# Patient Record
Sex: FEMALE | Race: WHITE | NOT HISPANIC OR LATINO | Employment: OTHER | URBAN - METROPOLITAN AREA
[De-identification: names, ages, dates, MRNs, and addresses within clinical notes are randomized per-mention and may not be internally consistent; named-entity substitution may affect disease eponyms.]

---

## 2017-01-05 PROBLEM — C18.2 MALIGNANT NEOPLASM OF ASCENDING COLON (HCC): Status: ACTIVE | Noted: 2017-01-05

## 2017-01-18 RX ORDER — INFLUENZA A VIRUS A/MICHIGAN/45/2015 X-275 (H1N1) ANTIGEN (FORMALDEHYDE INACTIVATED), INFLUENZA A VIRUS A/SINGAPORE/INFIMH-16-0019/2016 IVR-186 (H3N2) ANTIGEN (FORMALDEHYDE INACTIVATED), AND INFLUENZA B VIRUS B/MARYLAND/15/2016 BX-69A (A B/COLORADO/6/2017-LIKE VIRUS) ANTIGEN (FORMALDEHYDE INACTIVATED) 60; 60; 60 UG/.5ML; UG/.5ML; UG/.5ML
INJECTION, SUSPENSION INTRAMUSCULAR
Refills: 0 | COMMUNITY
Start: 2016-10-26 | End: 2017-01-27

## 2017-01-18 RX ORDER — PNEUMOCOCCAL 13-VALENT CONJUGATE VACCINE 2.2; 2.2; 2.2; 2.2; 2.2; 4.4; 2.2; 2.2; 2.2; 2.2; 2.2; 2.2; 2.2 UG/.5ML; UG/.5ML; UG/.5ML; UG/.5ML; UG/.5ML; UG/.5ML; UG/.5ML; UG/.5ML; UG/.5ML; UG/.5ML; UG/.5ML; UG/.5ML; UG/.5ML
INJECTION, SUSPENSION INTRAMUSCULAR
Refills: 0 | COMMUNITY
Start: 2016-10-26

## 2017-01-27 ENCOUNTER — LAB (OUTPATIENT)
Dept: LAB | Facility: HOSPITAL | Age: 77
End: 2017-01-27

## 2017-01-27 ENCOUNTER — OFFICE VISIT (OUTPATIENT)
Dept: ONCOLOGY | Facility: CLINIC | Age: 77
End: 2017-01-27

## 2017-01-27 VITALS
WEIGHT: 142 LBS | SYSTOLIC BLOOD PRESSURE: 112 MMHG | DIASTOLIC BLOOD PRESSURE: 60 MMHG | HEIGHT: 62 IN | HEART RATE: 86 BPM | RESPIRATION RATE: 16 BRPM | BODY MASS INDEX: 26.13 KG/M2 | TEMPERATURE: 98.4 F

## 2017-01-27 DIAGNOSIS — D64.9 ANEMIA, UNSPECIFIED TYPE: Primary | ICD-10-CM

## 2017-01-27 LAB
BASOPHILS # BLD AUTO: 0.02 10*3/MM3 (ref 0–0.1)
BASOPHILS NFR BLD AUTO: 0.3 % (ref 0–1.1)
DEPRECATED RDW RBC AUTO: 45.2 FL (ref 37–49)
EOSINOPHIL # BLD AUTO: 0.27 10*3/MM3 (ref 0–0.36)
EOSINOPHIL NFR BLD AUTO: 4.1 % (ref 1–5)
ERYTHROCYTE [DISTWIDTH] IN BLOOD BY AUTOMATED COUNT: 14.4 % (ref 11.7–14.5)
HCT VFR BLD AUTO: 41.2 % (ref 34–45)
HGB BLD-MCNC: 12.9 G/DL (ref 11.5–14.9)
IMM GRANULOCYTES # BLD: 0.02 10*3/MM3 (ref 0–0.03)
IMM GRANULOCYTES NFR BLD: 0.3 % (ref 0–0.5)
LYMPHOCYTES # BLD AUTO: 2.31 10*3/MM3 (ref 1–3.5)
LYMPHOCYTES NFR BLD AUTO: 35.4 % (ref 20–49)
MCH RBC QN AUTO: 26.9 PG (ref 27–33)
MCHC RBC AUTO-ENTMCNC: 31.3 G/DL (ref 32–35)
MCV RBC AUTO: 85.8 FL (ref 83–97)
MONOCYTES # BLD AUTO: 0.53 10*3/MM3 (ref 0.25–0.8)
MONOCYTES NFR BLD AUTO: 8.1 % (ref 4–12)
NEUTROPHILS # BLD AUTO: 3.37 10*3/MM3 (ref 1.5–7)
NEUTROPHILS NFR BLD AUTO: 51.8 % (ref 39–75)
NRBC BLD MANUAL-RTO: 0 /100 WBC (ref 0–0)
PLATELET # BLD AUTO: 235 10*3/MM3 (ref 150–375)
PMV BLD AUTO: 10.7 FL (ref 8.9–12.1)
RBC # BLD AUTO: 4.8 10*6/MM3 (ref 3.9–5)
WBC NRBC COR # BLD: 6.52 10*3/MM3 (ref 4–10)

## 2017-01-27 PROCEDURE — 36416 COLLJ CAPILLARY BLOOD SPEC: CPT

## 2017-01-27 PROCEDURE — 85025 COMPLETE CBC W/AUTO DIFF WBC: CPT

## 2017-01-27 PROCEDURE — 99214 OFFICE O/P EST MOD 30 MIN: CPT | Performed by: INTERNAL MEDICINE

## 2017-01-31 NOTE — PROGRESS NOTES
REASONS FOR FOLLOWUP:     T3N1(13 nodes neg), stage 3 colon ca      HISTORY OF PRESENT ILLNESS:  The patient is a 76 y.o. year old female  who is here for follow-up with the above-mentioned history.We had previously discussed Xeloda therapy adjuvantly which we will now pursue.   Chemoeducation and capecitabine procurement are pursued.    Past Medical History   Diagnosis Date   • Diabetes mellitus    • Hyperlipidemia    • Hypertension    • Neuropathy      Past Surgical History   Procedure Laterality Date   • Hysterectomy     • Colon resection Right 12/31/2016     Procedure: COLON RESECTION RIGHT;  Surgeon: Dmitri Aranda MD;  Location: [facility];  Service:    • Cholecystectomy N/A 12/31/2016     Procedure: CHOLECYSTECTOMY;  Surgeon: Dmitri Aranda MD;  Location: [facility];  Service:        HEMATOLOGIC/ONCOLOGIC HISTORY:  (History from previous dates can be found in the separate document.)    MEDICATIONS    Current Outpatient Prescriptions:   •  aspirin 325 MG tablet, Take 325 mg by mouth., Disp: , Rfl:   •  carvedilol (COREG) 6.25 MG tablet, Take 1 tablet by mouth 2 (two) times a day with meals., Disp: 60 tablet, Rfl: 0  •  colchicine 0.6 MG tablet, Take 1 tablet by mouth every 12 (twelve) hours. Until pain completely gone in ankle, Disp: 60 tablet, Rfl: 0  •  dicyclomine (BENTYL) 20 MG tablet, Take 1 tablet by mouth Every 6 (Six) Hours., Disp: 30 tablet, Rfl: 0  •  ergocalciferol (ERGOCALCIFEROL) 89850 UNITS capsule, Take 50,000 Units by mouth., Disp: , Rfl:   •  ferrous sulfate 325 (65 FE) MG tablet, Take 1 tablet by mouth 2 (Two) Times a Day With Meals for 30 days., Disp: 60 tablet, Rfl: 0  •  gabapentin (NEURONTIN) 600 MG tablet, Take 800 mg by mouth 3 (Three) Times a Day., Disp: , Rfl:   •  hydrochlorothiazide (HYDRODIURIL) 25 MG tablet, Take 25 mg by mouth daily., Disp: , Rfl:   •  levoFLOXacin (LEVAQUIN) 750 MG tablet, Take  by mouth., Disp: , Rfl:   •  levothyroxine (SYNTHROID, LEVOTHROID) 50  "MCG tablet, Take 88 mcg by mouth Daily., Disp: , Rfl:   •  metFORMIN (GLUCOPHAGE) 500 MG tablet, Take 500 mg by mouth 2 (two) times a day with meals., Disp: , Rfl:   •  pravastatin (PRAVACHOL) 40 MG tablet, Take 40 mg by mouth daily., Disp: , Rfl:   •  PREVNAR 13 vaccine, inject 0.5 milliliter intramuscularly, Disp: , Rfl: 0    ALLERGIES:   No Known Allergies    SOCIAL HISTORY:       Social History     Social History   • Marital status:      Spouse name: N/A   • Number of children: N/A   • Years of education: N/A     Occupational History   • Retired      Social History Main Topics   • Smoking status: Never Smoker   • Smokeless tobacco: Not on file   • Alcohol use No   • Drug use: No   • Sexual activity: Defer     Other Topics Concern   • Not on file     Social History Narrative         FAMILY HISTORY:  Family History   Problem Relation Age of Onset   • Heart disease Mother    • Cancer Father        REVIEW OF SYSTEMS:  GENERAL: No change in appetite or weight;   No fevers, chills, sweats.    SKIN: No nonhealing lesions.   No rashes.  HEME/LYMPH: No easy bruising, bleeding.   No swollen nodes.   EYES: No vision changes or diplopia.   ENT: No tinnitus, hearing loss, gum bleeding, epistaxis, hoarseness or dysphagia.   RESPIRATORY: No cough, shortness of breath, hemoptysis or wheezing.   CVS: No chest pain, palpitations, orthopnea, dyspnea on exertion or PND.   GI: No melena or hematochezia.   No abdominal pain.  No nausea, vomiting, constipation, diarrhea  : No lower tract obstructive symptoms, dysuria or hematuria.   MUSCULOSKELETAL: No bone pain.  No joint stiffness.   NEUROLOGICAL: No global weakness, loss of consciousness or seizures.   PSYCHIATRIC: No increased nervousness, mood changes or depression.     Objective    Vitals:    01/27/17 1352   BP: 112/60   Pulse: 86   Resp: 16   Temp: 98.4 °F (36.9 °C)   Weight: 142 lb (64.4 kg)   Height: 61.81\" (157 cm)   PainSc: 5  Comment: from her surgery     Current " Status 1/27/2017   ECOG score 0      PHYSICAL EXAM:    GENERAL:  Well-developed, well-nourished in no acute distress.   SKIN:  Warm, dry without rashes, purpura or petechiae.  EYES:  Pupils equal, round and reactive to light.  EOMs intact.  Conjunctivae normal.  EARS:  Hearing intact.  NOSE:  Septum midline.  No excoriations or nasal discharge.  MOUTH:  Tongue is well-papillated; no stomatitis or ulcers.  Lips normal.  THROAT:  Oropharynx without lesions or exudates.  NECK:  Supple with good range of motion; no thyromegaly or masses, no JVD.  LYMPHATICS:  No cervical, supraclavicular, axillary or inguinal adenopathy.  CHEST:  Lungs clear to auscultation. Good airflow.  CARDIAC:  Regular rate and rhythm without murmurs, rubs or gallops. Normal S1,S2.  ABDOMEN:  Soft, nontender with no hepatosplenomegaly or masses.  EXTREMITIES:  No clubbing, cyanosis or edema.  NEUROLOGICAL:  Cranial Nerves II-XII grossly intact.  No focal neurological deficits.  PSYCHIATRIC:  Normal affect and mood.     RECENT LABS:        WBC   Date/Time Value Ref Range Status   01/27/2017 01:36 PM 6.52 4.00 - 10.00 10*3/mm3 Final     HEMOGLOBIN   Date/Time Value Ref Range Status   01/27/2017 01:36 PM 12.9 11.5 - 14.9 g/dL Final   12/31/2016 07:56 AM 10.5 (L) 12.0 - 17.0 g/dL Final     PLATELETS   Date/Time Value Ref Range Status   01/27/2017 01:36  150 - 375 10*3/mm3 Final       Assessment/Plan:  T3N1(13 nodes neg), stage 3 colon ca:We plan Xeloda adjuvantly x 6 months.Education and procurement are begun.the risks and benefits of therapy are reviewed.

## 2017-02-01 ENCOUNTER — DOCUMENTATION (OUTPATIENT)
Dept: ONCOLOGY | Facility: CLINIC | Age: 77
End: 2017-02-01

## 2017-02-01 RX ORDER — CAPECITABINE 500 MG/1
TABLET, FILM COATED ORAL
Qty: 56 TABLET | Refills: 2 | Status: SHIPPED | OUTPATIENT
Start: 2017-02-01 | End: 2017-04-27 | Stop reason: SDDI

## 2017-02-01 NOTE — PROGRESS NOTES
Per Dr Amezquita-Pt will need Xeloda 1000 mg BID 2 weeks on 1 week off. Process will be started.  Pt's [facility] is the point of contact for pt.

## 2017-02-02 ENCOUNTER — OFFICE VISIT (OUTPATIENT)
Dept: ONCOLOGY | Facility: CLINIC | Age: 77
End: 2017-02-02

## 2017-02-02 ENCOUNTER — APPOINTMENT (OUTPATIENT)
Dept: LAB | Facility: HOSPITAL | Age: 77
End: 2017-02-02

## 2017-02-02 VITALS — WEIGHT: 143.2 LBS | BODY MASS INDEX: 26.35 KG/M2

## 2017-02-02 DIAGNOSIS — C18.2 MALIGNANT NEOPLASM OF ASCENDING COLON (HCC): Primary | ICD-10-CM

## 2017-02-02 PROCEDURE — 99215 OFFICE O/P EST HI 40 MIN: CPT | Performed by: NURSE PRACTITIONER

## 2017-02-02 RX ORDER — ONDANSETRON 4 MG/1
4 TABLET, FILM COATED ORAL EVERY 8 HOURS PRN
Qty: 30 TABLET | Refills: 1 | Status: SHIPPED | OUTPATIENT
Start: 2017-02-02

## 2017-02-02 NOTE — PROGRESS NOTES
Subjective     No primary care provider on file.    PATIENT NAME:  Lisbet Fletcher  YOB: 1940  PATIENTS AGE:  76 y.o.  PATIENTS SEX:  female  DATE OF SERVICE:  02/02/2017  PROVIDER:  ALMA López      __________ORAL CHEMOTHERAPY PATIENT EDUCATION__________    PATIENT EDUCATION:  Today I met with the patient to discuss ORAL chemotherapy/biotherapy recommended for treatment of her disease.  Also discussed were medication administration, adherence, and proper handling/disposal.  The patient received the Oral Chemotherapy/Biotherapy Plan Summary including diagnosis and specific treatment plan.    SIDE EFFECTS:  Common side effects were discussed with the patient and daughter.  Discussion included hair loss/discoloration, anemia/fatigue, infection/chills/fever, appetite, bleeding risk/precautions, constipation, diarrhea, mouth sores, taste alteration, loss of appetite,nausea/vomiting, skin/nail changes, rash, muscle aches/weakness, photosensitivity, weight gain/loss,  liver damage, lung damage, kidney damage, DVT/PE risk, fluid retention, pleural/pericardial effusion, somnolence, electrolyte/LFT imbalance.    Discussion also included side effects specific to drugs in the treatment plan, specifically Xeloda.    A total of 50 minutes were spent with the patient, with 100% of time spent in education and counseling.      ALMA López

## 2017-02-10 ENCOUNTER — DOCUMENTATION (OUTPATIENT)
Dept: ONCOLOGY | Facility: CLINIC | Age: 77
End: 2017-02-10

## 2017-02-16 ENCOUNTER — DOCUMENTATION (OUTPATIENT)
Dept: ONCOLOGY | Facility: CLINIC | Age: 77
End: 2017-02-16

## 2017-02-16 NOTE — PROGRESS NOTES
Called patient, she has received Xeloda.  When reviewing her information for oral adherence I noticed she does not have any follow up scheudled.  She said her granddaughter would call our office to set up an appointment.  Please have her scheduled in 2 weeks for cbc, cmp and MD/NP review.

## 2017-03-03 ENCOUNTER — TELEPHONE (OUTPATIENT)
Dept: ONCOLOGY | Facility: CLINIC | Age: 77
End: 2017-03-03

## 2017-03-03 NOTE — TELEPHONE ENCOUNTER
ORAL ADHERENCE PHONE NOTE  A phone conversation was held with the patient today regarding CHEMO/BIOTHERAPY, specifically Xeloda.   We reviewed the proper handling of the medication, as well as proper storage.  The patient also knows to dispose of the medication through the pharmacy or the office.  The treatment schedule was reviewed, including any scheduled breaks in therapy.  The patient verbalized understanding of how to take the medication, with or without food, and any food-drug interactions of which they should be aware.  The patient’s status was reviewed, including any possible side effects s/he may be experiencing.  Lab and appointment schedule were reviewed.  Patient concerns and questions were addressed and answered.  Approximately 5 minutes were spent in education and counseling on oral medication adherence via the telephone.  She completed her last dose yesterday, she denies concerns.  Follow up next week.

## 2017-03-24 ENCOUNTER — TELEPHONE (OUTPATIENT)
Dept: ONCOLOGY | Facility: CLINIC | Age: 77
End: 2017-03-24

## 2017-03-24 NOTE — TELEPHONE ENCOUNTER
ORAL ADHERENCE PHONE NOTE  A phone conversation was held with the patient today regarding CHEMO/BIOTHERAPY, specifically Xeloda.   We reviewed the proper handling of the medication, as well as proper storage.  The patient also knows to dispose of the medication through the pharmacy or the office.  The treatment schedule was reviewed, including any scheduled breaks in therapy.  The patient verbalized understanding of how to take the medication, with or without food, and any food-drug interactions of which they should be aware.  The patient’s status was reviewed, including any possible side effects s/he may be experiencing.  Lab and appointment schedule were reviewed.  Patient concerns and questions were addressed and answered.  Approximately 5 minutes were spent in education and counseling on oral medication adherence via the telephone.  She denied hand food symptoms, diarrhea, or fevers.  She does not have a follow up appointment scheduled and the last time we talked her granddaughter was supposed to make a follow up appointment.  I have asked scheduling to call the granddaughter and get a follow up apt made asap as the patient just completed her last dose for this cycle and it would be an opportune time for evaluation. [facility] is calling the her.

## 2017-03-29 DIAGNOSIS — C18.2 MALIGNANT NEOPLASM OF ASCENDING COLON (HCC): Primary | ICD-10-CM

## 2017-03-30 ENCOUNTER — APPOINTMENT (OUTPATIENT)
Dept: ONCOLOGY | Facility: CLINIC | Age: 77
End: 2017-03-30

## 2017-03-30 ENCOUNTER — APPOINTMENT (OUTPATIENT)
Dept: OTHER | Facility: HOSPITAL | Age: 77
End: 2017-03-30

## 2017-04-10 RX ORDER — CAPECITABINE 500 MG/1
TABLET, FILM COATED ORAL
OUTPATIENT
Start: 2017-04-10

## 2017-04-10 NOTE — TELEPHONE ENCOUNTER
Xeloda refill request rec electronically from [facility]. Request denied as pt does not have any future appts. On 3/24/17-Inge SCOTT, NP spoke with pt about oral adherance and reminded her to make an appt. [facility] in scheduling was going to attempt to contact pts granddaughter yet there is no appt made as of 4/10/17. I will also attempt to speak with pt and encourage her to make a follow-up appt.

## 2017-04-27 ENCOUNTER — OFFICE VISIT (OUTPATIENT)
Dept: ONCOLOGY | Facility: CLINIC | Age: 77
End: 2017-04-27

## 2017-04-27 ENCOUNTER — LAB (OUTPATIENT)
Dept: OTHER | Facility: HOSPITAL | Age: 77
End: 2017-04-27

## 2017-04-27 ENCOUNTER — HOSPITAL ENCOUNTER (OUTPATIENT)
Dept: CT IMAGING | Facility: HOSPITAL | Age: 77
Discharge: HOME | End: 2017-04-27
Attending: INTERNAL MEDICINE | Admitting: INTERNAL MEDICINE

## 2017-04-27 VITALS
HEIGHT: 62 IN | OXYGEN SATURATION: 90 % | DIASTOLIC BLOOD PRESSURE: 69 MMHG | WEIGHT: 144.3 LBS | SYSTOLIC BLOOD PRESSURE: 127 MMHG | TEMPERATURE: 98.3 F | BODY MASS INDEX: 26.55 KG/M2 | HEART RATE: 95 BPM | RESPIRATION RATE: 16 BRPM

## 2017-04-27 DIAGNOSIS — C18.2 MALIGNANT NEOPLASM OF ASCENDING COLON (HCC): ICD-10-CM

## 2017-04-27 DIAGNOSIS — C18.2 MALIGNANT NEOPLASM OF ASCENDING COLON (HCC): Primary | ICD-10-CM

## 2017-04-27 PROBLEM — C18.9 COLON ADENOCARCINOMA (HCC): Status: ACTIVE | Noted: 2017-04-27

## 2017-04-27 LAB
ALBUMIN SERPL-MCNC: 4 G/DL (ref 3.5–5.2)
ALBUMIN/GLOB SERPL: 1.1 G/DL
ALP SERPL-CCNC: 99 U/L (ref 39–117)
ALT SERPL W P-5'-P-CCNC: 10 U/L (ref 1–33)
ANION GAP SERPL CALCULATED.3IONS-SCNC: 11 MMOL/L
AST SERPL-CCNC: 14 U/L (ref 1–32)
BASOPHILS # BLD AUTO: 0.03 10*3/MM3 (ref 0–0.2)
BASOPHILS NFR BLD AUTO: 0.3 % (ref 0–1.5)
BILIRUB SERPL-MCNC: 0.5 MG/DL (ref 0.1–1.2)
BUN BLD-MCNC: 10 MG/DL (ref 8–23)
BUN/CREAT SERPL: 14.7 (ref 7–25)
CALCIUM SPEC-SCNC: 9.5 MG/DL (ref 8.6–10.5)
CHLORIDE SERPL-SCNC: 93 MMOL/L (ref 98–107)
CO2 SERPL-SCNC: 35 MMOL/L (ref 22–29)
CREAT BLD-MCNC: 0.68 MG/DL (ref 0.57–1)
DEPRECATED RDW RBC AUTO: 55.3 FL (ref 37–54)
EOSINOPHIL # BLD AUTO: 0.19 10*3/MM3 (ref 0–0.7)
EOSINOPHIL NFR BLD AUTO: 2 % (ref 0.3–6.2)
ERYTHROCYTE [DISTWIDTH] IN BLOOD BY AUTOMATED COUNT: 16.9 % (ref 11.7–13)
GFR SERPL CREATININE-BSD FRML MDRD: 84 ML/MIN/1.73
GLOBULIN UR ELPH-MCNC: 3.8 GM/DL
GLUCOSE BLD-MCNC: 111 MG/DL (ref 65–99)
HCT VFR BLD AUTO: 40.3 % (ref 35.6–45.5)
HGB BLD-MCNC: 13.3 G/DL (ref 11.9–15.5)
IMM GRANULOCYTES # BLD: 0.05 10*3/MM3 (ref 0–0.03)
IMM GRANULOCYTES NFR BLD: 0.5 % (ref 0–0.5)
LYMPHOCYTES # BLD AUTO: 2.55 10*3/MM3 (ref 0.9–4.8)
LYMPHOCYTES NFR BLD AUTO: 27.4 % (ref 19.6–45.3)
MCH RBC QN AUTO: 29.6 PG (ref 26.9–32)
MCHC RBC AUTO-ENTMCNC: 33 G/DL (ref 32.4–36.3)
MCV RBC AUTO: 89.6 FL (ref 80.5–98.2)
MONOCYTES # BLD AUTO: 0.77 10*3/MM3 (ref 0.2–1.2)
MONOCYTES NFR BLD AUTO: 8.3 % (ref 5–12)
NEUTROPHILS # BLD AUTO: 5.7 10*3/MM3 (ref 1.9–8.1)
NEUTROPHILS NFR BLD AUTO: 61.5 % (ref 42.7–76)
NRBC BLD MANUAL-RTO: 0 /100 WBC (ref 0–0)
PLATELET # BLD AUTO: 277 10*3/MM3 (ref 140–500)
PMV BLD AUTO: 10.5 FL (ref 6–12)
POTASSIUM BLD-SCNC: 3.2 MMOL/L (ref 3.5–5.2)
PROT SERPL-MCNC: 7.8 G/DL (ref 6–8.5)
RBC # BLD AUTO: 4.5 10*6/MM3 (ref 3.9–5.2)
SODIUM BLD-SCNC: 139 MMOL/L (ref 136–145)
WBC NRBC COR # BLD: 9.29 10*3/MM3 (ref 4.5–10.7)

## 2017-04-27 PROCEDURE — 0 DIATRIZOATE MEGLUMINE & SODIUM PER 1 ML: Performed by: INTERNAL MEDICINE

## 2017-04-27 PROCEDURE — 99214 OFFICE O/P EST MOD 30 MIN: CPT | Performed by: INTERNAL MEDICINE

## 2017-04-27 PROCEDURE — 82565 ASSAY OF CREATININE: CPT

## 2017-04-27 PROCEDURE — 85025 COMPLETE CBC W/AUTO DIFF WBC: CPT | Performed by: INTERNAL MEDICINE

## 2017-04-27 PROCEDURE — 0 IOPAMIDOL 61 % SOLUTION: Performed by: INTERNAL MEDICINE

## 2017-04-27 PROCEDURE — 80053 COMPREHEN METABOLIC PANEL: CPT | Performed by: INTERNAL MEDICINE

## 2017-04-27 PROCEDURE — 71260 CT THORAX DX C+: CPT

## 2017-04-27 PROCEDURE — 36415 COLL VENOUS BLD VENIPUNCTURE: CPT

## 2017-04-27 PROCEDURE — 74177 CT ABD & PELVIS W/CONTRAST: CPT

## 2017-04-27 RX ORDER — AMOXICILLIN 875 MG/1
TABLET, COATED ORAL
Refills: 0 | COMMUNITY
Start: 2017-04-25 | End: 2017-05-09

## 2017-04-27 RX ADMIN — DIATRIZOATE MEGLUMINE AND DIATRIZOATE SODIUM 30 ML: 660; 100 LIQUID ORAL; RECTAL at 14:00

## 2017-04-27 RX ADMIN — IOPAMIDOL 85 ML: 612 INJECTION, SOLUTION INTRAVENOUS at 14:00

## 2017-04-27 NOTE — PROGRESS NOTES
REASONS FOR FOLLOWUP:     T3N1(13 nodes neg), stage 3 colon ca      HISTORY OF PRESENT ILLNESS:  The patient is a 77 y.o. year old female  who is here for follow-up with the above-mentioned history.We had previously discussed Xeloda therapy adjuvantly which we will now pursue.   Chemoeducation and capecitabine procurement are pursued.    Past Medical History:   Diagnosis Date   • Anemia    • Diabetes mellitus    • Disease of thyroid gland    • H/O Lung mass    • Hyperlipidemia    • Hypertension    • Malignant neoplasm of ascending colon    • Neuropathy      Past Surgical History:   Procedure Laterality Date   • CHOLECYSTECTOMY N/A 12/31/2016    Procedure: CHOLECYSTECTOMY;  Surgeon: Dmitri Aranda MD;  Location: [facility];  Service:    • COLON RESECTION Right 12/31/2016    Procedure: COLON RESECTION RIGHT;  Surgeon: Dmitri Aranda MD;  Location: [facility];  Service:    • HYSTERECTOMY         HEMATOLOGIC/ONCOLOGIC HISTORY:  (History from previous dates can be found in the separate document.)    MEDICATIONS    Current Outpatient Prescriptions:   •  amoxicillin (AMOXIL) 875 MG tablet, take 1 tablet by mouth twice a day for 10 days, Disp: , Rfl: 0  •  aspirin 325 MG tablet, Take 325 mg by mouth., Disp: , Rfl:   •  carvedilol (COREG) 6.25 MG tablet, Take 1 tablet by mouth 2 (two) times a day with meals., Disp: 60 tablet, Rfl: 0  •  colchicine 0.6 MG tablet, Take 1 tablet by mouth every 12 (twelve) hours. Until pain completely gone in ankle, Disp: 60 tablet, Rfl: 0  •  dicyclomine (BENTYL) 20 MG tablet, Take 1 tablet by mouth Every 6 (Six) Hours., Disp: 30 tablet, Rfl: 0  •  ergocalciferol (ERGOCALCIFEROL) 83533 UNITS capsule, Take 50,000 Units by mouth., Disp: , Rfl:   •  gabapentin (NEURONTIN) 600 MG tablet, Take 800 mg by mouth 3 (Three) Times a Day., Disp: , Rfl:   •  hydrochlorothiazide (HYDRODIURIL) 25 MG tablet, Take 25 mg by mouth daily., Disp: , Rfl:   •  levothyroxine (SYNTHROID, LEVOTHROID) 50  MCG tablet, Take 88 mcg by mouth Daily., Disp: , Rfl:   •  metFORMIN (GLUCOPHAGE) 500 MG tablet, Take 500 mg by mouth 2 (two) times a day with meals., Disp: , Rfl:   •  ondansetron (ZOFRAN) 4 MG tablet, Take 1 tablet by mouth Every 8 (Eight) Hours As Needed for nausea or vomiting., Disp: 30 tablet, Rfl: 1  •  pravastatin (PRAVACHOL) 40 MG tablet, Take 40 mg by mouth daily., Disp: , Rfl:   •  PREVNAR 13 vaccine, inject 0.5 milliliter intramuscularly, Disp: , Rfl: 0    ALLERGIES:   No Known Allergies    SOCIAL HISTORY:       Social History     Social History   • Marital status:      Spouse name: N/A   • Number of children: N/A   • Years of education: N/A     Occupational History   •  Retired     Social History Main Topics   • Smoking status: Never Smoker   • Smokeless tobacco: Not on file   • Alcohol use No   • Drug use: No   • Sexual activity: Defer     Other Topics Concern   • Not on file     Social History Narrative         FAMILY HISTORY:  Family History   Problem Relation Age of Onset   • Heart disease Mother    • Cancer Father    • Cancer Sister    • Cancer Brother    • Throat cancer Son      with metastases   • Diabetes Other    • Cancer Brother    • Cancer Sister    • Cancer Sister    • Cancer Sister        REVIEW OF SYSTEMS:  GENERAL: No change in appetite or weight;   No fevers, chills, sweats.    SKIN: No nonhealing lesions.   No rashes.  HEME/LYMPH: No easy bruising, bleeding.   No swollen nodes.   EYES: No vision changes or diplopia.   ENT: No tinnitus, hearing loss, gum bleeding, epistaxis, hoarseness or dysphagia.   RESPIRATORY: No cough, shortness of breath, hemoptysis or wheezing.   CVS: No chest pain, palpitations, orthopnea, dyspnea on exertion or PND.   GI: No melena or hematochezia.   No abdominal pain.  No nausea, vomiting, constipation, diarrhea  : No lower tract obstructive symptoms, dysuria or hematuria.   MUSCULOSKELETAL: No bone pain.  No joint stiffness.   NEUROLOGICAL: No global  "weakness, loss of consciousness or seizures.   PSYCHIATRIC: No increased nervousness, mood changes or depression.     Objective    Vitals:    04/27/17 1117   BP: 127/69   Pulse: 95   Resp: 16   Temp: 98.3 °F (36.8 °C)   TempSrc: Oral   SpO2: 90%   Weight: 144 lb 4.8 oz (65.5 kg)   Height: 61.81\" (157 cm)   PainSc: 0-No pain     Current Status 4/27/2017   ECOG score 0      PHYSICAL EXAM:    GENERAL:  Well-developed, well-nourished in no acute distress.   SKIN:  Warm, dry without rashes, purpura or petechiae.  EYES:  Pupils equal, round and reactive to light.  EOMs intact.  Conjunctivae normal.  EARS:  Hearing intact.  NOSE:  Septum midline.  No excoriations or nasal discharge.  MOUTH:  Tongue is well-papillated; no stomatitis or ulcers.  Lips normal.  THROAT:  Oropharynx without lesions or exudates.  NECK:  Supple with good range of motion; no thyromegaly or masses, no JVD.  LYMPHATICS:  No cervical, supraclavicular, axillary or inguinal adenopathy.  CHEST:  Lungs clear to auscultation. Good airflow.  CARDIAC:  Regular rate and rhythm without murmurs, rubs or gallops. Normal S1,S2.  ABDOMEN:  Soft, nontender with no hepatosplenomegaly or masses.  EXTREMITIES:  No clubbing, cyanosis or edema.  NEUROLOGICAL:  Cranial Nerves II-XII grossly intact.  No focal neurological deficits.  PSYCHIATRIC:  Normal affect and mood.     RECENT LABS:        WBC   Date/Time Value Ref Range Status   04/27/2017 11:07 AM 9.29 4.50 - 10.70 10*3/mm3 Final     Hemoglobin   Date/Time Value Ref Range Status   04/27/2017 11:07 AM 13.3 11.9 - 15.5 g/dL Final     Platelets   Date/Time Value Ref Range Status   04/27/2017 11:07  140 - 500 10*3/mm3 Final       Assessment/Plan:  T3N1(13 nodes neg), stage 3 colon ca:Hold Xeloda given poor compliance and active staging    Abdominal pain: counts fluctuate. Her compliance has been a major issue and I stressed the importance of compliance. We will attempt to view the CT today...perhaps obviating a " biopsy

## 2017-04-28 LAB — CREAT BLDA-MCNC: 0.7 MG/DL (ref 0.6–1.3)

## 2017-05-08 DIAGNOSIS — C18.2 MALIGNANT NEOPLASM OF ASCENDING COLON (HCC): Primary | ICD-10-CM

## 2017-05-09 ENCOUNTER — LAB (OUTPATIENT)
Dept: OTHER | Facility: HOSPITAL | Age: 77
End: 2017-05-09

## 2017-05-09 ENCOUNTER — OFFICE VISIT (OUTPATIENT)
Dept: ONCOLOGY | Facility: CLINIC | Age: 77
End: 2017-05-09

## 2017-05-09 ENCOUNTER — DOCUMENTATION (OUTPATIENT)
Dept: ONCOLOGY | Facility: CLINIC | Age: 77
End: 2017-05-09

## 2017-05-09 VITALS
WEIGHT: 145.6 LBS | TEMPERATURE: 98.3 F | RESPIRATION RATE: 16 BRPM | SYSTOLIC BLOOD PRESSURE: 116 MMHG | OXYGEN SATURATION: 93 % | BODY MASS INDEX: 26.79 KG/M2 | DIASTOLIC BLOOD PRESSURE: 65 MMHG | HEART RATE: 75 BPM | HEIGHT: 62 IN

## 2017-05-09 DIAGNOSIS — C18.2 MALIGNANT NEOPLASM OF ASCENDING COLON (HCC): Primary | ICD-10-CM

## 2017-05-09 DIAGNOSIS — C18.2 MALIGNANT NEOPLASM OF ASCENDING COLON (HCC): ICD-10-CM

## 2017-05-09 LAB
ALBUMIN SERPL-MCNC: 4 G/DL (ref 3.5–5.2)
ALBUMIN/GLOB SERPL: 1.3 G/DL
ALP SERPL-CCNC: 77 U/L (ref 39–117)
ALT SERPL W P-5'-P-CCNC: 12 U/L (ref 1–33)
ANION GAP SERPL CALCULATED.3IONS-SCNC: 14.2 MMOL/L
AST SERPL-CCNC: 16 U/L (ref 1–32)
BASOPHILS # BLD AUTO: 0.04 10*3/MM3 (ref 0–0.2)
BASOPHILS NFR BLD AUTO: 0.6 % (ref 0–1.5)
BILIRUB SERPL-MCNC: 0.4 MG/DL (ref 0.1–1.2)
BUN BLD-MCNC: 16 MG/DL (ref 8–23)
BUN/CREAT SERPL: 25.8 (ref 7–25)
CALCIUM SPEC-SCNC: 9.3 MG/DL (ref 8.6–10.5)
CHLORIDE SERPL-SCNC: 97 MMOL/L (ref 98–107)
CO2 SERPL-SCNC: 31.8 MMOL/L (ref 22–29)
CREAT BLD-MCNC: 0.62 MG/DL (ref 0.57–1)
DEPRECATED RDW RBC AUTO: 55.2 FL (ref 37–54)
EOSINOPHIL # BLD AUTO: 0.17 10*3/MM3 (ref 0–0.7)
EOSINOPHIL NFR BLD AUTO: 2.7 % (ref 0.3–6.2)
ERYTHROCYTE [DISTWIDTH] IN BLOOD BY AUTOMATED COUNT: 16.3 % (ref 11.7–13)
GFR SERPL CREATININE-BSD FRML MDRD: 93 ML/MIN/1.73
GLOBULIN UR ELPH-MCNC: 3.2 GM/DL
GLUCOSE BLD-MCNC: 159 MG/DL (ref 65–99)
HCT VFR BLD AUTO: 40.9 % (ref 35.6–45.5)
HGB BLD-MCNC: 13.2 G/DL (ref 11.9–15.5)
IMM GRANULOCYTES # BLD: 0.02 10*3/MM3 (ref 0–0.03)
IMM GRANULOCYTES NFR BLD: 0.3 % (ref 0–0.5)
LYMPHOCYTES # BLD AUTO: 2.24 10*3/MM3 (ref 0.9–4.8)
LYMPHOCYTES NFR BLD AUTO: 35.1 % (ref 19.6–45.3)
MCH RBC QN AUTO: 29.7 PG (ref 26.9–32)
MCHC RBC AUTO-ENTMCNC: 32.3 G/DL (ref 32.4–36.3)
MCV RBC AUTO: 91.9 FL (ref 80.5–98.2)
MONOCYTES # BLD AUTO: 0.37 10*3/MM3 (ref 0.2–1.2)
MONOCYTES NFR BLD AUTO: 5.8 % (ref 5–12)
NEUTROPHILS # BLD AUTO: 3.54 10*3/MM3 (ref 1.9–8.1)
NEUTROPHILS NFR BLD AUTO: 55.5 % (ref 42.7–76)
NRBC BLD MANUAL-RTO: 0 /100 WBC (ref 0–0)
PLATELET # BLD AUTO: 259 10*3/MM3 (ref 140–500)
PMV BLD AUTO: 10.3 FL (ref 6–12)
POTASSIUM BLD-SCNC: 3.5 MMOL/L (ref 3.5–5.2)
PROT SERPL-MCNC: 7.2 G/DL (ref 6–8.5)
RBC # BLD AUTO: 4.45 10*6/MM3 (ref 3.9–5.2)
SODIUM BLD-SCNC: 143 MMOL/L (ref 136–145)
WBC NRBC COR # BLD: 6.38 10*3/MM3 (ref 4.5–10.7)

## 2017-05-09 PROCEDURE — 80053 COMPREHEN METABOLIC PANEL: CPT | Performed by: INTERNAL MEDICINE

## 2017-05-09 PROCEDURE — 85025 COMPLETE CBC W/AUTO DIFF WBC: CPT | Performed by: INTERNAL MEDICINE

## 2017-05-09 PROCEDURE — 99213 OFFICE O/P EST LOW 20 MIN: CPT | Performed by: INTERNAL MEDICINE

## 2017-05-09 PROCEDURE — 36415 COLL VENOUS BLD VENIPUNCTURE: CPT

## 2017-05-09 RX ORDER — CAPECITABINE 500 MG/1
TABLET, FILM COATED ORAL
Qty: 56 TABLET | Refills: 0 | Status: SHIPPED | OUTPATIENT
Start: 2017-05-09 | End: 2023-02-09

## 2017-06-05 DIAGNOSIS — C18.2 MALIGNANT NEOPLASM OF ASCENDING COLON (HCC): Primary | ICD-10-CM

## 2017-06-22 ENCOUNTER — DOCUMENTATION (OUTPATIENT)
Dept: ONCOLOGY | Facility: CLINIC | Age: 77
End: 2017-06-22

## 2017-06-22 RX ORDER — CAPECITABINE 500 MG/1
TABLET, FILM COATED ORAL
OUTPATIENT
Start: 2017-06-22

## 2017-06-22 NOTE — PROGRESS NOTES
After many unsuccessful phone calls to pt to reschedule her 6/7/17 appt with Dr Amezquita that she was a no show for, pt has been scheduled for appt today, 6/22/17. A refill request has been rec electronically and will be refilled once she is seen by Dr Amezquita.

## 2020-10-04 ENCOUNTER — APPOINTMENT (OUTPATIENT)
Dept: CT IMAGING | Facility: HOSPITAL | Age: 80
End: 2020-10-04

## 2020-10-04 ENCOUNTER — APPOINTMENT (OUTPATIENT)
Dept: GENERAL RADIOLOGY | Facility: HOSPITAL | Age: 80
End: 2020-10-04

## 2020-10-04 ENCOUNTER — HOSPITAL ENCOUNTER (OUTPATIENT)
Facility: HOSPITAL | Age: 80
Setting detail: OBSERVATION
Discharge: HOME HEALTH | End: 2020-10-06
Attending: EMERGENCY MEDICINE | Admitting: HOSPITALIST

## 2020-10-04 DIAGNOSIS — S16.1XXA STRAIN OF NECK MUSCLE, INITIAL ENCOUNTER: ICD-10-CM

## 2020-10-04 DIAGNOSIS — S42.292A OTHER CLOSED DISPLACED FRACTURE OF PROXIMAL END OF LEFT HUMERUS, INITIAL ENCOUNTER: ICD-10-CM

## 2020-10-04 DIAGNOSIS — R09.02 HYPOXIA: ICD-10-CM
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
## 2020-10-04 DIAGNOSIS — S09.90XA INJURY OF HEAD, INITIAL ENCOUNTER: ICD-10-CM

## 2020-10-04 LAB
ALBUMIN SERPL-MCNC: 3.9 G/DL (ref 3.5–5.2)
ALBUMIN/GLOB SERPL: 1.6 G/DL
ALP SERPL-CCNC: 56 U/L (ref 39–117)
ALT SERPL W P-5'-P-CCNC: 12 U/L (ref 1–33)
ANION GAP SERPL CALCULATED.3IONS-SCNC: 10.2 MMOL/L (ref 5–15)
AST SERPL-CCNC: 16 U/L (ref 1–32)
BASOPHILS # BLD AUTO: 0.03 10*3/MM3 (ref 0–0.2)
BASOPHILS NFR BLD AUTO: 0.5 % (ref 0–1.5)
BILIRUB SERPL-MCNC: 0.3 MG/DL (ref 0–1.2)
BUN SERPL-MCNC: 14 MG/DL (ref 8–23)
BUN/CREAT SERPL: 16.3 (ref 7–25)
CALCIUM SPEC-SCNC: 8.7 MG/DL (ref 8.6–10.5)
CHLORIDE SERPL-SCNC: 101 MMOL/L (ref 98–107)
CO2 SERPL-SCNC: 30.8 MMOL/L (ref 22–29)
CREAT SERPL-MCNC: 0.86 MG/DL (ref 0.57–1)
DEPRECATED RDW RBC AUTO: 46.8 FL (ref 37–54)
EOSINOPHIL # BLD AUTO: 0.22 10*3/MM3 (ref 0–0.4)
EOSINOPHIL NFR BLD AUTO: 3.6 % (ref 0.3–6.2)
ERYTHROCYTE [DISTWIDTH] IN BLOOD BY AUTOMATED COUNT: 13.7 % (ref 12.3–15.4)
GFR SERPL CREATININE-BSD FRML MDRD: 63 ML/MIN/1.73
GLOBULIN UR ELPH-MCNC: 2.5 GM/DL
GLUCOSE SERPL-MCNC: 191 MG/DL (ref 65–99)
HCT VFR BLD AUTO: 36.8 % (ref 34–46.6)
HGB BLD-MCNC: 12.2 G/DL (ref 12–15.9)
IMM GRANULOCYTES # BLD AUTO: 0.02 10*3/MM3 (ref 0–0.05)
IMM GRANULOCYTES NFR BLD AUTO: 0.3 % (ref 0–0.5)
LYMPHOCYTES # BLD AUTO: 1.23 10*3/MM3 (ref 0.7–3.1)
LYMPHOCYTES NFR BLD AUTO: 19.9 % (ref 19.6–45.3)
MCH RBC QN AUTO: 30.7 PG (ref 26.6–33)
MCHC RBC AUTO-ENTMCNC: 33.2 G/DL (ref 31.5–35.7)
MCV RBC AUTO: 92.7 FL (ref 79–97)
MONOCYTES # BLD AUTO: 0.33 10*3/MM3 (ref 0.1–0.9)
MONOCYTES NFR BLD AUTO: 5.3 % (ref 5–12)
NEUTROPHILS NFR BLD AUTO: 4.35 10*3/MM3 (ref 1.7–7)
NEUTROPHILS NFR BLD AUTO: 70.4 % (ref 42.7–76)
NRBC BLD AUTO-RTO: 0 /100 WBC (ref 0–0.2)
PLATELET # BLD AUTO: 179 10*3/MM3 (ref 140–450)
PMV BLD AUTO: 10 FL (ref 6–12)
POTASSIUM SERPL-SCNC: 3.5 MMOL/L (ref 3.5–5.2)
PROT SERPL-MCNC: 6.4 G/DL (ref 6–8.5)
RBC # BLD AUTO: 3.97 10*6/MM3 (ref 3.77–5.28)
SODIUM SERPL-SCNC: 142 MMOL/L (ref 136–145)
WBC # BLD AUTO: 6.18 10*3/MM3 (ref 3.4–10.8)

## 2020-10-04 PROCEDURE — 71045 X-RAY EXAM CHEST 1 VIEW: CPT

## 2020-10-04 PROCEDURE — 85025 COMPLETE CBC W/AUTO DIFF WBC: CPT | Performed by: PHYSICIAN ASSISTANT

## 2020-10-04 PROCEDURE — 0202U NFCT DS 22 TRGT SARS-COV-2: CPT | Performed by: PHYSICIAN ASSISTANT

## 2020-10-04 PROCEDURE — 72125 CT NECK SPINE W/O DYE: CPT

## 2020-10-04 PROCEDURE — 73030 X-RAY EXAM OF SHOULDER: CPT

## 2020-10-04 PROCEDURE — 80053 COMPREHEN METABOLIC PANEL: CPT | Performed by: PHYSICIAN ASSISTANT

## 2020-10-04 PROCEDURE — 99285 EMERGENCY DEPT VISIT HI MDM: CPT

## 2020-10-04 PROCEDURE — 25010000002 MORPHINE PER 10 MG: Performed by: EMERGENCY MEDICINE

## 2020-10-04 PROCEDURE — 96374 THER/PROPH/DIAG INJ IV PUSH: CPT

## 2020-10-04 PROCEDURE — 83880 ASSAY OF NATRIURETIC PEPTIDE: CPT | Performed by: PHYSICIAN ASSISTANT

## 2020-10-04 PROCEDURE — 96375 TX/PRO/DX INJ NEW DRUG ADDON: CPT

## 2020-10-04 PROCEDURE — 93005 ELECTROCARDIOGRAM TRACING: CPT | Performed by: PHYSICIAN ASSISTANT

## 2020-10-04 PROCEDURE — 25010000002 ONDANSETRON PER 1 MG: Performed by: EMERGENCY MEDICINE

## 2020-10-04 PROCEDURE — 70450 CT HEAD/BRAIN W/O DYE: CPT

## 2020-10-04 PROCEDURE — 84484 ASSAY OF TROPONIN QUANT: CPT | Performed by: PHYSICIAN ASSISTANT

## 2020-10-04 RX ORDER — ONDANSETRON 2 MG/ML
4 INJECTION INTRAMUSCULAR; INTRAVENOUS ONCE
Status: COMPLETED | OUTPATIENT
Start: 2020-10-04 | End: 2020-10-04

## 2020-10-04 RX ORDER — OXYCODONE HYDROCHLORIDE AND ACETAMINOPHEN 5; 325 MG/1; MG/1
1 TABLET ORAL ONCE
Status: COMPLETED | OUTPATIENT
Start: 2020-10-04 | End: 2020-10-04

## 2020-10-04 RX ORDER — SODIUM CHLORIDE 0.9 % (FLUSH) 0.9 %
10 SYRINGE (ML) INJECTION AS NEEDED
Status: DISCONTINUED | OUTPATIENT
Start: 2020-10-04 | End: 2020-10-06 | Stop reason: HOSPADM

## 2020-10-04 RX ORDER — MORPHINE SULFATE 2 MG/ML
2 INJECTION, SOLUTION INTRAMUSCULAR; INTRAVENOUS ONCE
Status: COMPLETED | OUTPATIENT
Start: 2020-10-04 | End: 2020-10-04

## 2020-10-04 RX ADMIN — MORPHINE SULFATE 2 MG: 2 INJECTION, SOLUTION INTRAMUSCULAR; INTRAVENOUS at 20:55

## 2020-10-04 RX ADMIN — ONDANSETRON 4 MG: 2 INJECTION INTRAMUSCULAR; INTRAVENOUS at 20:55

## 2020-10-04 RX ADMIN — OXYCODONE HYDROCHLORIDE AND ACETAMINOPHEN 1 TABLET: 5; 325 TABLET ORAL at 22:06

## 2020-10-05 ENCOUNTER — APPOINTMENT (OUTPATIENT)
Dept: CT IMAGING | Facility: HOSPITAL | Age: 80
End: 2020-10-05
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
## 2020-10-05 PROBLEM — S42.202A CLOSED FRACTURE OF PROXIMAL END OF LEFT HUMERUS: Status: ACTIVE | Noted: 2020-10-05

## 2020-10-05 PROBLEM — Z72.0 TOBACCO ABUSE: Status: ACTIVE | Noted: 2020-10-05

## 2020-10-05 PROBLEM — Z72.0 TOBACCO ABUSE: Status: RESOLVED | Noted: 2020-10-05 | Resolved: 2020-10-05

## 2020-10-05 PROBLEM — S42.292A CLOSED FRACTURE OF HEAD OF LEFT HUMERUS: Status: ACTIVE | Noted: 2020-10-05

## 2020-10-05 PROBLEM — I10 ESSENTIAL HYPERTENSION: Status: ACTIVE | Noted: 2020-10-05

## 2020-10-05 LAB
ANION GAP SERPL CALCULATED.3IONS-SCNC: 14.3 MMOL/L (ref 5–15)
B PARAPERT DNA SPEC QL NAA+PROBE: NOT DETECTED
B PERT DNA SPEC QL NAA+PROBE: NOT DETECTED
BUN SERPL-MCNC: 14 MG/DL (ref 8–23)
BUN/CREAT SERPL: 19.2 (ref 7–25)
C PNEUM DNA NPH QL NAA+NON-PROBE: NOT DETECTED
CALCIUM SPEC-SCNC: 8.2 MG/DL (ref 8.6–10.5)
CHLORIDE SERPL-SCNC: 101 MMOL/L (ref 98–107)
CO2 SERPL-SCNC: 24.7 MMOL/L (ref 22–29)
CREAT SERPL-MCNC: 0.73 MG/DL (ref 0.57–1)
DEPRECATED RDW RBC AUTO: 43.3 FL (ref 37–54)
ERYTHROCYTE [DISTWIDTH] IN BLOOD BY AUTOMATED COUNT: 13.4 % (ref 12.3–15.4)
FLUAV H1 2009 PAND RNA NPH QL NAA+PROBE: NOT DETECTED
FLUAV H1 HA GENE NPH QL NAA+PROBE: NOT DETECTED
FLUAV H3 RNA NPH QL NAA+PROBE: NOT DETECTED
FLUAV SUBTYP SPEC NAA+PROBE: NOT DETECTED
FLUBV RNA ISLT QL NAA+PROBE: NOT DETECTED
GFR SERPL CREATININE-BSD FRML MDRD: 77 ML/MIN/1.73
GLUCOSE BLDC GLUCOMTR-MCNC: 119 MG/DL (ref 70–130)
GLUCOSE BLDC GLUCOMTR-MCNC: 159 MG/DL (ref 70–130)
GLUCOSE BLDC GLUCOMTR-MCNC: 188 MG/DL (ref 70–130)
GLUCOSE SERPL-MCNC: 179 MG/DL (ref 65–99)
HADV DNA SPEC NAA+PROBE: NOT DETECTED
HBA1C MFR BLD: 6.06 % (ref 4.8–5.6)
HCOV 229E RNA SPEC QL NAA+PROBE: NOT DETECTED
HCOV HKU1 RNA SPEC QL NAA+PROBE: NOT DETECTED
HCOV NL63 RNA SPEC QL NAA+PROBE: NOT DETECTED
HCOV OC43 RNA SPEC QL NAA+PROBE: NOT DETECTED
HCT VFR BLD AUTO: 32.9 % (ref 34–46.6)
HGB BLD-MCNC: 10.8 G/DL (ref 12–15.9)
HMPV RNA NPH QL NAA+NON-PROBE: NOT DETECTED
HPIV1 RNA SPEC QL NAA+PROBE: NOT DETECTED
HPIV2 RNA SPEC QL NAA+PROBE: NOT DETECTED
HPIV3 RNA NPH QL NAA+PROBE: NOT DETECTED
HPIV4 P GENE NPH QL NAA+PROBE: NOT DETECTED
M PNEUMO IGG SER IA-ACNC: NOT DETECTED
MCH RBC QN AUTO: 29.5 PG (ref 26.6–33)
MCHC RBC AUTO-ENTMCNC: 32.8 G/DL (ref 31.5–35.7)
MCV RBC AUTO: 89.9 FL (ref 79–97)
NT-PROBNP SERPL-MCNC: 503.3 PG/ML (ref 0–1800)
PLATELET # BLD AUTO: 199 10*3/MM3 (ref 140–450)
PMV BLD AUTO: 10.4 FL (ref 6–12)
POTASSIUM SERPL-SCNC: 3.4 MMOL/L (ref 3.5–5.2)
RBC # BLD AUTO: 3.66 10*6/MM3 (ref 3.77–5.28)
RHINOVIRUS RNA SPEC NAA+PROBE: NOT DETECTED
RSV RNA NPH QL NAA+NON-PROBE: NOT DETECTED
SARS-COV-2 RNA NPH QL NAA+NON-PROBE: NOT DETECTED
SODIUM SERPL-SCNC: 140 MMOL/L (ref 136–145)
TROPONIN T SERPL-MCNC: <0.01 NG/ML (ref 0–0.03)
WBC # BLD AUTO: 6.77 10*3/MM3 (ref 3.4–10.8)

## 2020-10-05 PROCEDURE — 96376 TX/PRO/DX INJ SAME DRUG ADON: CPT

## 2020-10-05 PROCEDURE — G0378 HOSPITAL OBSERVATION PER HR: HCPCS

## 2020-10-05 PROCEDURE — 82962 GLUCOSE BLOOD TEST: CPT

## 2020-10-05 PROCEDURE — 94799 UNLISTED PULMONARY SVC/PX: CPT

## 2020-10-05 PROCEDURE — 83036 HEMOGLOBIN GLYCOSYLATED A1C: CPT | Performed by: NURSE PRACTITIONER

## 2020-10-05 PROCEDURE — 76377 3D RENDER W/INTRP POSTPROCES: CPT

## 2020-10-05 PROCEDURE — 36415 COLL VENOUS BLD VENIPUNCTURE: CPT | Performed by: NURSE PRACTITIONER

## 2020-10-05 PROCEDURE — 25010000002 MORPHINE PER 10 MG: Performed by: INTERNAL MEDICINE

## 2020-10-05 PROCEDURE — 73200 CT UPPER EXTREMITY W/O DYE: CPT

## 2020-10-05 PROCEDURE — 85027 COMPLETE CBC AUTOMATED: CPT | Performed by: NURSE PRACTITIONER

## 2020-10-05 PROCEDURE — 93010 ELECTROCARDIOGRAM REPORT: CPT | Performed by: INTERNAL MEDICINE

## 2020-10-05 PROCEDURE — 80048 BASIC METABOLIC PNL TOTAL CA: CPT | Performed by: NURSE PRACTITIONER

## 2020-10-05 PROCEDURE — 96361 HYDRATE IV INFUSION ADD-ON: CPT

## 2020-10-05 RX ORDER — HYDROCODONE BITARTRATE AND ACETAMINOPHEN 7.5; 325 MG/1; MG/1
1 TABLET ORAL EVERY 4 HOURS PRN
Status: DISCONTINUED | OUTPATIENT
Start: 2020-10-05 | End: 2020-10-06 | Stop reason: HOSPADM

## 2020-10-05 RX ORDER — DEXTROSE MONOHYDRATE 25 G/50ML
25 INJECTION, SOLUTION INTRAVENOUS
Status: DISCONTINUED | OUTPATIENT
Start: 2020-10-05 | End: 2020-10-06 | Stop reason: HOSPADM

## 2020-10-05 RX ORDER — BISACODYL 10 MG
10 SUPPOSITORY, RECTAL RECTAL DAILY PRN
Status: DISCONTINUED | OUTPATIENT
Start: 2020-10-05 | End: 2020-10-06 | Stop reason: HOSPADM

## 2020-10-05 RX ORDER — SODIUM CHLORIDE 9 MG/ML
100 INJECTION, SOLUTION INTRAVENOUS CONTINUOUS
Status: DISCONTINUED | OUTPATIENT
Start: 2020-10-05 | End: 2020-10-06 | Stop reason: HOSPADM

## 2020-10-05 RX ORDER — ASPIRIN 325 MG
325 TABLET ORAL DAILY
Status: DISCONTINUED | OUTPATIENT
Start: 2020-10-05 | End: 2020-10-06 | Stop reason: HOSPADM

## 2020-10-05 RX ORDER — CARVEDILOL 6.25 MG/1
6.25 TABLET ORAL 2 TIMES DAILY WITH MEALS
Status: DISCONTINUED | OUTPATIENT
Start: 2020-10-05 | End: 2020-10-06 | Stop reason: HOSPADM

## 2020-10-05 RX ORDER — BISACODYL 5 MG/1
5 TABLET, DELAYED RELEASE ORAL DAILY PRN
Status: DISCONTINUED | OUTPATIENT
Start: 2020-10-05 | End: 2020-10-06 | Stop reason: HOSPADM

## 2020-10-05 RX ORDER — LEVOTHYROXINE SODIUM 88 UG/1
88 TABLET ORAL
Status: DISCONTINUED | OUTPATIENT
Start: 2020-10-05 | End: 2020-10-06 | Stop reason: HOSPADM

## 2020-10-05 RX ORDER — UBIDECARENONE 75 MG
50 CAPSULE ORAL DAILY
COMMUNITY

## 2020-10-05 RX ORDER — ONDANSETRON 2 MG/ML
4 INJECTION INTRAMUSCULAR; INTRAVENOUS EVERY 6 HOURS PRN
Status: DISCONTINUED | OUTPATIENT
Start: 2020-10-05 | End: 2020-10-06 | Stop reason: HOSPADM

## 2020-10-05 RX ORDER — SODIUM CHLORIDE 0.9 % (FLUSH) 0.9 %
10 SYRINGE (ML) INJECTION AS NEEDED
Status: DISCONTINUED | OUTPATIENT
Start: 2020-10-05 | End: 2020-10-06 | Stop reason: HOSPADM

## 2020-10-05 RX ORDER — ONDANSETRON 4 MG/1
4 TABLET, FILM COATED ORAL EVERY 6 HOURS PRN
Status: DISCONTINUED | OUTPATIENT
Start: 2020-10-05 | End: 2020-10-06 | Stop reason: HOSPADM

## 2020-10-05 RX ORDER — ALUMINA, MAGNESIA, AND SIMETHICONE 2400; 2400; 240 MG/30ML; MG/30ML; MG/30ML
15 SUSPENSION ORAL EVERY 6 HOURS PRN
Status: DISCONTINUED | OUTPATIENT
Start: 2020-10-05 | End: 2020-10-06 | Stop reason: HOSPADM

## 2020-10-05 RX ORDER — ACETAMINOPHEN 325 MG/1
650 TABLET ORAL EVERY 4 HOURS PRN
Status: DISCONTINUED | OUTPATIENT
Start: 2020-10-05 | End: 2020-10-06 | Stop reason: HOSPADM

## 2020-10-05 RX ORDER — NICOTINE POLACRILEX 4 MG
15 LOZENGE BUCCAL
Status: DISCONTINUED | OUTPATIENT
Start: 2020-10-05 | End: 2020-10-06 | Stop reason: HOSPADM

## 2020-10-05 RX ORDER — PRAVASTATIN SODIUM 40 MG
40 TABLET ORAL DAILY
Status: DISCONTINUED | OUTPATIENT
Start: 2020-10-05 | End: 2020-10-06 | Stop reason: HOSPADM

## 2020-10-05 RX ORDER — MORPHINE SULFATE 2 MG/ML
2 INJECTION, SOLUTION INTRAMUSCULAR; INTRAVENOUS
Status: DISCONTINUED | OUTPATIENT
Start: 2020-10-05 | End: 2020-10-06 | Stop reason: HOSPADM

## 2020-10-05 RX ORDER — GABAPENTIN 400 MG/1
800 CAPSULE ORAL EVERY 8 HOURS SCHEDULED
Status: DISCONTINUED | OUTPATIENT
Start: 2020-10-05 | End: 2020-10-06 | Stop reason: HOSPADM

## 2020-10-05 RX ADMIN — PRAVASTATIN SODIUM 40 MG: 40 TABLET ORAL at 09:16

## 2020-10-05 RX ADMIN — HYDROCODONE BITARTRATE AND ACETAMINOPHEN 1 TABLET: 7.5; 325 TABLET ORAL at 13:43

## 2020-10-05 RX ADMIN — LEVOTHYROXINE SODIUM 88 MCG: 88 TABLET ORAL at 09:16

## 2020-10-05 RX ADMIN — HYDROCODONE BITARTRATE AND ACETAMINOPHEN 1 TABLET: 7.5; 325 TABLET ORAL at 09:18

## 2020-10-05 RX ADMIN — CARVEDILOL 6.25 MG: 6.25 TABLET, FILM COATED ORAL at 21:30

## 2020-10-05 RX ADMIN — GABAPENTIN 800 MG: 400 CAPSULE ORAL at 21:30

## 2020-10-05 RX ADMIN — MORPHINE SULFATE 2 MG: 2 INJECTION, SOLUTION INTRAMUSCULAR; INTRAVENOUS at 05:03

## 2020-10-05 RX ADMIN — ASPIRIN 325 MG: 325 TABLET ORAL at 09:20

## 2020-10-05 RX ADMIN — GABAPENTIN 800 MG: 400 CAPSULE ORAL at 13:45

## 2020-10-05 RX ADMIN — HYDROCODONE BITARTRATE AND ACETAMINOPHEN 1 TABLET: 7.5; 325 TABLET ORAL at 22:14

## 2020-10-05 RX ADMIN — SODIUM CHLORIDE 100 ML/HR: 9 INJECTION, SOLUTION INTRAVENOUS at 06:51

## 2020-10-05 RX ADMIN — MORPHINE SULFATE 2 MG: 2 INJECTION, SOLUTION INTRAMUSCULAR; INTRAVENOUS at 03:09

## 2020-10-05 RX ADMIN — CARVEDILOL 6.25 MG: 6.25 TABLET, FILM COATED ORAL at 09:16

## 2020-10-05 RX ADMIN — GABAPENTIN 800 MG: 400 CAPSULE ORAL at 06:51

## 2020-10-05 RX ADMIN — SODIUM CHLORIDE 100 ML/HR: 9 INJECTION, SOLUTION INTRAVENOUS at 21:30

## 2020-10-05 NOTE — ED PROVIDER NOTES
EMERGENCY DEPARTMENT ENCOUNTER    Room Number:  E669/1  Date of encounter:  10/5/2020  PCP: System, Provider Not In  Historian: Patient      HPI:  Chief Complaint: Fall with left shoulder injury  A complete HPI/ROS/PMH/PSH/SH/FH are unobtainable due to: Nothing    Context: Lisbet Fletcher is a 80 y.o. female who presents to the ED c/o sustaining a mechanical fall while running after her grandchild just outside her house.  Patient states her feet just got tangled up causing her to fall onto the right shoulder and hit the head.  She denies injury to her lower extremities, right upper extremity, chest wall, abdomen in the process.  She denies palpitations, chest pain, shortness of breath leading up to the fall.  She describes the pain in the left shoulder is a 7 out of 10 when remaining still and a 9 out of 10 with slight movement.  She further denies any numbness and tingling distal to the left shoulder injury.  Patient denies taking anticoagulant therapy at this time.      PAST MEDICAL HISTORY  Active Ambulatory Problems     Diagnosis Date Noted   • Community acquired pneumonia 09/06/2016   • Neuropathy 09/06/2016   • Hyperlipidemia 09/06/2016   • Type 2 diabetes mellitus with hyperglycemia, without long-term current use of insulin (CMS/HCC) 09/06/2016   • Hypothyroidism 09/06/2016   • Anemia 09/06/2016   • Chest pain, right sided pleuritic 09/06/2016   • Acute respiratory failure with hypoxemia (CMS/HCC) 09/10/2016   • Malignant neoplasm of ascending colon (CMS/HCC) 01/05/2017   • Colon adenocarcinoma (CMS/HCC) 04/27/2017     Resolved Ambulatory Problems     Diagnosis Date Noted   • Right sided abdominal pain 12/29/2016   • Colonic mass 12/31/2016     Past Medical History:   Diagnosis Date   • Diabetes mellitus (CMS/HCC)    • Disease of thyroid gland    • H/O Lung mass    • Hypertension          PAST SURGICAL HISTORY  Past Surgical History:   Procedure Laterality Date   • CHOLECYSTECTOMY N/A 12/31/2016    Procedure:  CHOLECYSTECTOMY;  Surgeon: Dmitri Aranda MD;  Location: [facility] OR;  Service:    • COLON RESECTION Right 12/31/2016    Procedure: COLON RESECTION RIGHT;  Surgeon: Dmitri Aranda MD;  Location: [facility] OR;  Service:    • HYSTERECTOMY           FAMILY HISTORY  Family History   Problem Relation Age of Onset   • Heart disease Mother    • Cancer Father    • Cancer Sister    • Cancer Brother    • Throat cancer Son         with metastases   • Diabetes Other    • Cancer Brother    • Cancer Sister    • Cancer Sister    • Cancer Sister          SOCIAL HISTORY  Social History     Socioeconomic History   • Marital status:      Spouse name: Not on file   • Number of children: Not on file   • Years of education: Not on file   • Highest education level: Not on file   Occupational History     Employer: RETIRED   Tobacco Use   • Smoking status: Never Smoker   Substance and Sexual Activity   • Alcohol use: No   • Drug use: No   • Sexual activity: Defer         ALLERGIES  Patient has no known allergies.        REVIEW OF SYSTEMS  Review of Systems   Constitutional: Negative.    HENT: Negative.    Eyes: Negative.    Respiratory: Negative.    Cardiovascular: Negative for chest pain, palpitations and leg swelling.   Gastrointestinal: Negative.    Musculoskeletal: Positive for joint swelling and neck pain. Negative for back pain.   Skin: Negative.    Neurological: Negative for numbness.   Psychiatric/Behavioral: Negative.         All systems reviewed and negative except for those discussed in HPI.       PHYSICAL EXAM    I have reviewed the triage vital signs and nursing notes.    ED Triage Vitals [10/04/20 2037]   Temp Heart Rate Resp BP SpO2   96 °F (35.6 °C) 70 18 143/89 96 %      Temp src Heart Rate Source Patient Position BP Location FiO2 (%)   Tympanic -- -- -- --       Physical Exam  GENERAL: Well-nourished, nontoxic, appears uncomfortable  HENT: nares patent, soft tissue swelling over the bridge of the nose but  without obvious deformity  EYES: no scleral icterus, PERRL, EOMs intact without pain or entrapment  CV: regular rhythm, regular rate, no obvious murmur, radial pulses +2 bilaterally, no pedal edema, no obvious JVD  RESPIRATORY: normal effort, lungs CTA B  ABDOMEN: soft, nontender, no ecchymosis or signs of trauma  MUSCULOSKELETAL: Soft tissue swelling and deformity to the vicinity of the left proximal humerus.  Increased pain with slight movement of the joint. Radian median and ulnar nerve function intact.  Chest wall nontender, T-spine, L-spine nontender, pelvis nontender, right upper extremity and bilateral lower extremities unremarkable.  NEURO: alert and oriented x3, moves all extremities, follows commands, cranial nerves II through XII grossly intact.  Sharp sensation intact at C5-C6-C7 of the affected extremity  SKIN: warm, dry no obvious skin fracture        LAB RESULTS  Recent Results (from the past 24 hour(s))   Comprehensive Metabolic Panel    Collection Time: 10/04/20  8:52 PM    Specimen: Blood   Result Value Ref Range    Glucose 191 (H) 65 - 99 mg/dL    BUN 14 8 - 23 mg/dL    Creatinine 0.86 0.57 - 1.00 mg/dL    Sodium 142 136 - 145 mmol/L    Potassium 3.5 3.5 - 5.2 mmol/L    Chloride 101 98 - 107 mmol/L    CO2 30.8 (H) 22.0 - 29.0 mmol/L    Calcium 8.7 8.6 - 10.5 mg/dL    Total Protein 6.4 6.0 - 8.5 g/dL    Albumin 3.90 3.50 - 5.20 g/dL    ALT (SGPT) 12 1 - 33 U/L    AST (SGOT) 16 1 - 32 U/L    Alkaline Phosphatase 56 39 - 117 U/L    Total Bilirubin 0.3 0.0 - 1.2 mg/dL    eGFR Non African Amer 63 >60 mL/min/1.73    Globulin 2.5 gm/dL    A/G Ratio 1.6 g/dL    BUN/Creatinine Ratio 16.3 7.0 - 25.0    Anion Gap 10.2 5.0 - 15.0 mmol/L   CBC Auto Differential    Collection Time: 10/04/20  8:52 PM    Specimen: Blood   Result Value Ref Range    WBC 6.18 3.40 - 10.80 10*3/mm3    RBC 3.97 3.77 - 5.28 10*6/mm3    Hemoglobin 12.2 12.0 - 15.9 g/dL    Hematocrit 36.8 34.0 - 46.6 %    MCV 92.7 79.0 - 97.0 fL    MCH  30.7 26.6 - 33.0 pg    MCHC 33.2 31.5 - 35.7 g/dL    RDW 13.7 12.3 - 15.4 %    RDW-SD 46.8 37.0 - 54.0 fl    MPV 10.0 6.0 - 12.0 fL    Platelets 179 140 - 450 10*3/mm3    Neutrophil % 70.4 42.7 - 76.0 %    Lymphocyte % 19.9 19.6 - 45.3 %    Monocyte % 5.3 5.0 - 12.0 %    Eosinophil % 3.6 0.3 - 6.2 %    Basophil % 0.5 0.0 - 1.5 %    Immature Grans % 0.3 0.0 - 0.5 %    Neutrophils, Absolute 4.35 1.70 - 7.00 10*3/mm3    Lymphocytes, Absolute 1.23 0.70 - 3.10 10*3/mm3    Monocytes, Absolute 0.33 0.10 - 0.90 10*3/mm3    Eosinophils, Absolute 0.22 0.00 - 0.40 10*3/mm3    Basophils, Absolute 0.03 0.00 - 0.20 10*3/mm3    Immature Grans, Absolute 0.02 0.00 - 0.05 10*3/mm3    nRBC 0.0 0.0 - 0.2 /100 WBC   Troponin    Collection Time: 10/04/20  8:52 PM    Specimen: Blood   Result Value Ref Range    Troponin T <0.010 0.000 - 0.030 ng/mL   BNP    Collection Time: 10/04/20  8:52 PM    Specimen: Blood   Result Value Ref Range    proBNP 503.3 0.0-1,800.0 pg/mL   Respiratory Panel PCR w/COVID-19(SARS-CoV-2) JOE/ABDOULAYE/FOX/PAD/[facility] In-House, NP Swab in [facility], 3-4 HR TAT - Swab, Nasopharynx    Collection Time: 10/04/20 11:50 PM    Specimen: Nasopharynx; Swab   Result Value Ref Range    ADENOVIRUS, PCR Not Detected Not Detected    Coronavirus 229E Not Detected Not Detected    Coronavirus HKU1 Not Detected Not Detected    Coronavirus NL63 Not Detected Not Detected    Coronavirus OC43 Not Detected Not Detected    COVID19 Not Detected Not Detected - Ref. Range    Human Metapneumovirus Not Detected Not Detected    Human Rhinovirus/Enterovirus Not Detected Not Detected    Influenza A PCR Not Detected Not Detected    Influenza A H1 Not Detected Not Detected    Influenza A H1 2009 PCR Not Detected Not Detected    Influenza A H3 Not Detected Not Detected    Influenza B PCR Not Detected Not Detected    Parainfluenza Virus 1 Not Detected Not Detected    Parainfluenza Virus 2 Not Detected Not Detected    Parainfluenza Virus 3 Not Detected Not  Detected    Parainfluenza Virus 4 Not Detected Not Detected    RSV, PCR Not Detected Not Detected    Bordetella pertussis pcr Not Detected Not Detected    Bordetella parapertussis PCR Not Detected Not Detected    Chlamydophila pneumoniae PCR Not Detected Not Detected    Mycoplasma pneumo by PCR Not Detected Not Detected       Ordered the above labs and independently reviewed the results.        RADIOLOGY  Xr Shoulder 2+ View Left    Result Date: 10/4/2020  2 VIEWS LEFT SHOULDER  HISTORY: Pain after a fall  COMPARISON: None available.  FINDINGS: There is a comminuted fracture of the proximal left humerus. No additional fractures are seen. There are degenerative changes involving the left shoulder and left AC joint.      Impacted comminuted proximal left humeral fracture.  This report was finalized on 10/4/2020 9:21 PM by Dr. Kristin Isaacs M.D.      Ct Head Without Contrast    Result Date: 10/4/2020  CT HEAD WITHOUT CONTRAST  HISTORY: Fall  COMPARISON: None available.  TECHNIQUE: Axial CT imaging was obtained through the brain. No IV contrast was administered.  FINDINGS: No acute intracranial hemorrhage is identified. There is diffuse atrophy. There is periventricular and deep white matter microangiopathic change. There is no midline shift or mass effect. No calvarial fracture is seen.      No acute intracranial hemorrhage.  Radiation dose reduction techniques were utilized, including automated exposure control and exposure modulation based on body size.  This report was finalized on 10/4/2020 10:25 PM by Dr. Kristin Isaacs M.D.      Ct Cervical Spine Without Contrast    Result Date: 10/4/2020  CT OF THE CERVICAL SPINE WITHOUT CONTRAST  HISTORY: Neck pain following trauma  COMPARISON: None available.  TECHNIQUE: Axial CT imaging was obtained through the cervical spine. Coronal and sagittal reformatted images were obtained.  FINDINGS: No acute fracture or subluxation of the cervical spine is identified. Cervical  vertebral body alignment appears within normal limits. Intervertebral disc space narrowing is noted most significantly at C6-C7. There is no prevertebral soft tissue swelling.  C2-C3: There is no significant canal stenosis or neural foraminal narrowing. C3-C4: There is no significant canal stenosis or neural foraminal narrowing. C4-C5: There is no significant canal stenosis or neural foraminal narrowing. C5-C6: There is no significant canal stenosis or neural foraminal narrowing. C6-C7: There is no significant canal stenosis. There is some mild neural foraminal narrowing on the left. C7-T1: There is no significant canal stenosis or neural foraminal narrowing.  Images through the lung apices are clear.      No acute fracture or subluxation identified.  Radiation dose reduction techniques were utilized, including automated exposure control and exposure modulation based on body size.  This report was finalized on 10/4/2020 10:31 PM by Dr. Kristin Isaacs M.D.      Xr Chest 1 View    Result Date: 10/5/2020  SINGLE VIEW CHEST  HISTORY: Shortness of air  COMPARISON: 12/30/2016  FINDINGS: Exam is degraded by patient positioning. The patient's left hand obscures the right lung base. Cardiomegaly is present. There is no vascular congestion. There is mild elevation of the right hemidiaphragm, not significantly changed. Proximal left humeral fracture is again seen. No pneumothorax or pleural effusion is identified. No displaced rib fractures are seen      No acute intrathoracic findings.  This report was finalized on 10/5/2020 12:07 AM by Dr. Kristin Isaacs M.D.        I ordered the above noted radiological studies. Reviewed by me and discussed with radiologist.  See dictation for official radiology interpretation.      PROCEDURES    Procedures  EKG          EKG time: 0013  Rhythm/Rate: 72/Sinus  P waves and CT: Normal pr inteval  QRS, axis: LVH   ST and T waves: Borderline T wave abnormalities diffuse  leads    Interpreted Contemporaneously by me, independently viewed  -No acute changes when compared to 12/30/2016    MEDICATIONS GIVEN IN ER    Medications   sodium chloride 0.9 % flush 10 mL (has no administration in time range)   sodium chloride 0.9 % flush 10 mL (has no administration in time range)   acetaminophen (TYLENOL) tablet 650 mg (has no administration in time range)   bisacodyl (DULCOLAX) suppository 10 mg (has no administration in time range)   bisacodyl (DULCOLAX) EC tablet 5 mg (has no administration in time range)   ondansetron (ZOFRAN) tablet 4 mg (has no administration in time range)     Or   ondansetron (ZOFRAN) injection 4 mg (has no administration in time range)   aluminum-magnesium hydroxide-simethicone (MAALOX MAX) 400-400-40 MG/5ML suspension 15 mL (has no administration in time range)   morphine injection 2 mg (2 mg Intravenous Given 10/5/20 7863)   influenza vac split quad (FLUZONE,FLUARIX,AFLURIA,FLULAVAL) injection 0.5 mL (has no administration in time range)   aspirin tablet 325 mg (has no administration in time range)   carvedilol (COREG) tablet 6.25 mg (has no administration in time range)   levothyroxine (SYNTHROID, LEVOTHROID) tablet 88 mcg (has no administration in time range)   pravastatin (PRAVACHOL) tablet 40 mg (has no administration in time range)   gabapentin (NEURONTIN) capsule 800 mg (has no administration in time range)   dextrose (GLUTOSE) oral gel 15 g (has no administration in time range)   dextrose (D50W) 25 g/ 50mL Intravenous Solution 25 g (has no administration in time range)   glucagon (human recombinant) (GLUCAGEN DIAGNOSTIC) injection 1 mg (has no administration in time range)   insulin lispro (humaLOG) injection 0-9 Units (has no administration in time range)   sodium chloride 0.9 % infusion (has no administration in time range)   morphine injection 2 mg (2 mg Intravenous Given 10/4/20 4345)   ondansetron (ZOFRAN) injection 4 mg (4 mg Intravenous Given 10/4/20  2055)   oxyCODONE-acetaminophen (PERCOCET) 5-325 MG per tablet 1 tablet (1 tablet Oral Given 10/4/20 2206)         PROGRESS, DATA ANALYSIS, CONSULTS, AND MEDICAL DECISION MAKING    All labs have been independently reviewed by me.  All radiology studies have been reviewed by me and discussed with radiologist dictating the report.   EKG's independently viewed and interpreted by me.  Discussion below represents my analysis of pertinent findings related to patient's condition, differential diagnosis, treatment plan and final disposition.    DDX includes not limited to: Head injury with intercranial hemorrhage, head injury without intracranial hemorrhage, cervical strain, cervical fracture, shoulder sprain, proximal humerus fracture, shoulder dislocation, clavicle fracture.  We will have time controlling the patient's pain in the emergency department.  After she was placed in a sling we attempted to ambulate the patient and her oxygen saturations fell to 86% on room air.  Once she was at rest again her oxygen saturation increased to 96%.  Her chest x-ray is unremarkable.  I suspect the decrease in oxygen saturation is probably secondary to the pain medication given to control the patient's pain in addition to some chronic underlying lung disease.  Either way the patient's pain is not well controlled at this time and feels she would be better served by observation in the hospital and having her shoulder evaluated by Ortho in the morning.  I spoke with Dr. Amaya who states he will see the patient in consult.  Will call medicine at this time to discuss observation.    ED Course as of Oct 05 0638   Mon Oct 05, 2020   0049 Discussed patient's case with Isael MARQUEZ with [facility].  To place patient in observation under Dr. Padilla's care at this time.    [RC]      ED Course User Index  [RC] Claudio Brice III, PA       ADMISSION    Discussed treatment plan and reason for admission with pt/family and admitting  physician.  Pt/family voiced understanding of the plan for admission for further testing/treatment as needed.         AS OF 06:38 EDT VITALS:    BP - 150/68  HR - 78  TEMP - 98.8 °F (37.1 °C) (Oral)  O2 SATS - 93%        DIAGNOSIS  Final diagnoses:   Fall, initial encounter   Other closed displaced fracture of proximal end of left humerus, initial encounter   Injury of head, initial encounter   Strain of neck muscle, initial encounter   Hypoxia         DISPOSITION  ADMISSION    Discussed treatment plan and reason for admission with pt/family and admitting physician.  Pt/family voiced understanding of the plan for admission for further testing/treatment as needed.                Claudio Brice III, PA  10/05/20 0625

## 2020-10-05 NOTE — H&P
Patient Name:  Lisbet Fletcher  YOB: 1940  MRN:  [id number]  Admit Date:  10/4/2020  Patient Care Team:  System, Provider Not In as PCP - General (Family Medicine)  Stefan Amezquita MD as Consulting Physician (Hematology and Oncology)  Dmitri Aranda MD as Referring Physician (General Surgery)      Subjective   History Present Illness     Chief Complaint   Patient presents with   • Fall   • Shoulder Injury     History of Present Illness   Ms. Fletcher is a 80 y.o. smoker with a history of non-insulin-dependent type 2 diabetes mellitus, hypothyroidism, hyperlipidemia, essential hypertension, and anemia that presents to [facility] complaining of shoulder pain secondary to a mechanical fall.  Patient reports that she was outside on the sidewalk when she tripped over a water pipe and fell forward and hit her nose.  She denies any loss of consciousness or head injury.  Work-up in the emergency department revealed a left proximal displaced humeral fracture.  Patient was placed in a sling in the ED with the plan of discharge but patient became hypoxic  at 86% on room air with ambulation.  She was kept for further evaluation.    Review of Systems   Constitutional: Negative for chills and fever.   HENT: Negative for congestion and rhinorrhea.    Eyes: Negative for photophobia and visual disturbance.   Respiratory: Negative for cough and shortness of breath.    Cardiovascular: Negative for chest pain and palpitations.   Gastrointestinal: Negative for constipation, diarrhea, nausea and vomiting.   Endocrine: Negative for cold intolerance and heat intolerance.   Genitourinary: Negative for difficulty urinating and dysuria.   Musculoskeletal: Positive for joint swelling and neck pain. Negative for gait problem.   Skin: Negative for rash and wound.   Neurological: Negative for dizziness, light-headedness and headaches.   Psychiatric/Behavioral: Negative for sleep disturbance and suicidal ideas.           Personal History     Past Medical History:   Diagnosis Date   • Anemia    • Diabetes mellitus (CMS/HCC)    • Disease of thyroid gland    • H/O Lung mass    • Hyperlipidemia    • Hypertension    • Malignant neoplasm of ascending colon (CMS/HCC)    • Neuropathy      Past Surgical History:   Procedure Laterality Date   • CHOLECYSTECTOMY N/A 12/31/2016    Procedure: CHOLECYSTECTOMY;  Surgeon: Dmitri Aranda MD;  Location: [facility];  Service:    • COLON RESECTION Right 12/31/2016    Procedure: COLON RESECTION RIGHT;  Surgeon: Dmitri Aranda MD;  Location: [facility];  Service:    • HYSTERECTOMY       Family History   Problem Relation Age of Onset   • Heart disease Mother    • Cancer Father    • Cancer Sister    • Cancer Brother    • Throat cancer Son         with metastases   • Diabetes Other    • Cancer Brother    • Cancer Sister    • Cancer Sister    • Cancer Sister      Social History     Tobacco Use   • Smoking status: Never Smoker   Substance Use Topics   • Alcohol use: No   • Drug use: No     No current facility-administered medications on file prior to encounter.      Current Outpatient Medications on File Prior to Encounter   Medication Sig Dispense Refill   • aspirin 325 MG tablet Take 325 mg by mouth Daily.     • carvedilol (COREG) 6.25 MG tablet Take 1 tablet by mouth 2 (two) times a day with meals. 60 tablet 0   • Cholecalciferol (vitamin D3) 125 MCG (5000 UT) capsule capsule Take 5,000 Units by mouth Daily.     • gabapentin (NEURONTIN) 800 MG tablet Take 800 mg by mouth 3 (Three) Times a Day.     • levothyroxine (SYNTHROID, LEVOTHROID) 50 MCG tablet Take 88 mcg by mouth Daily.     • metFORMIN (GLUCOPHAGE) 500 MG tablet Take 500 mg by mouth 2 (two) times a day with meals.     • ondansetron (ZOFRAN) 4 MG tablet Take 1 tablet by mouth Every 8 (Eight) Hours As Needed for nausea or vomiting. 30 tablet 1   • pravastatin (PRAVACHOL) 40 MG tablet Take 40 mg by mouth daily.     • vitamin B-12  (CYANOCOBALAMIN) 100 MCG tablet Take 50 mcg by mouth Daily.     • capecitabine (XELODA) 500 MG chemo tablet Take 2 tabs (1000 mg) po twice a day for 14 days on then 7 days off. 56 tablet 0   • colchicine 0.6 MG tablet Take 1 tablet by mouth every 12 (twelve) hours. Until pain completely gone in ankle 60 tablet 0   • dicyclomine (BENTYL) 20 MG tablet Take 1 tablet by mouth Every 6 (Six) Hours. (Patient taking differently: Take 20 mg by mouth 4 (Four) Times a Day As Needed.) 30 tablet 0   • ergocalciferol (ERGOCALCIFEROL) 27045 UNITS capsule Take 50,000 Units by mouth.     • hydrochlorothiazide (HYDRODIURIL) 25 MG tablet Take 25 mg by mouth daily.     • PREVNAR 13 vaccine inject 0.5 milliliter intramuscularly  0     No Known Allergies    Objective    Objective     Vital Signs  Temp:  [96 °F (35.6 °C)-98.8 °F (37.1 °C)] 98.8 °F (37.1 °C)  Heart Rate:  [68-78] 78  Resp:  [18-20] 20  BP: (108-155)/(62-89) 150/68  SpO2:  [88 %-97 %] 93 %  on  Flow (L/min):  [2] 2;   Device (Oxygen Therapy): nasal cannula  Body mass index is 28.1 kg/m².    Physical Exam  Constitutional:       General: She is not in acute distress.     Appearance: She is well-developed. She is not toxic-appearing.   HENT:      Head: Normocephalic and atraumatic.   Eyes:      General: No scleral icterus.        Right eye: No discharge.         Left eye: No discharge.      Conjunctiva/sclera: Conjunctivae normal.   Neck:      Musculoskeletal: Normal range of motion and neck supple.      Vascular: No JVD.   Cardiovascular:      Rate and Rhythm: Normal rate and regular rhythm.      Heart sounds: Normal heart sounds. No murmur. No friction rub. No gallop.    Pulmonary:      Effort: Pulmonary effort is normal. No respiratory distress.      Breath sounds: Normal breath sounds. No wheezing or rales.      Comments: 93% on 2 L O2  Abdominal:      General: Bowel sounds are normal. There is no distension.      Palpations: Abdomen is soft.      Tenderness: There is no  abdominal tenderness. There is no guarding.   Musculoskeletal:         General: Tenderness present. No deformity.      Comments: Left arm in sling   Skin:     General: Skin is warm and dry.      Capillary Refill: Capillary refill takes less than 2 seconds.   Neurological:      Mental Status: She is alert and oriented to person, place, and time.   Psychiatric:         Behavior: Behavior normal.     Results Review:  I reviewed the patient's new clinical results.  Discussed with ED provider.    Lab Results (last 24 hours)     Procedure Component Value Units Date/Time    CBC & Differential [[id number]]  (Normal) Collected: 10/04/20 2052    Specimen: Blood Updated: 10/04/20 2103    Narrative:      The following orders were created for panel order CBC & Differential.  Procedure                               Abnormality         Status                     ---------                               -----------         ------                     CBC Auto Differential[[id number]]        Normal              Final result                 Please view results for these tests on the individual orders.    Comprehensive Metabolic Panel [[id number]]  (Abnormal) Collected: 10/04/20 2052    Specimen: Blood Updated: 10/04/20 2134     Glucose 191 mg/dL      BUN 14 mg/dL      Creatinine 0.86 mg/dL      Sodium 142 mmol/L      Potassium 3.5 mmol/L      Chloride 101 mmol/L      CO2 30.8 mmol/L      Calcium 8.7 mg/dL      Total Protein 6.4 g/dL      Albumin 3.90 g/dL      ALT (SGPT) 12 U/L      AST (SGOT) 16 U/L      Alkaline Phosphatase 56 U/L      Total Bilirubin 0.3 mg/dL      eGFR Non African Amer 63 mL/min/1.73      Globulin 2.5 gm/dL      A/G Ratio 1.6 g/dL      BUN/Creatinine Ratio 16.3     Anion Gap 10.2 mmol/L     Narrative:      GFR Normal >60  Chronic Kidney Disease <60  Kidney Failure <15      CBC Auto Differential [[id number]]  (Normal) Collected: 10/04/20 2052    Specimen: Blood Updated: 10/04/20 2103     WBC 6.18 10*3/mm3      RBC 3.97  10*6/mm3      Hemoglobin 12.2 g/dL      Hematocrit 36.8 %      MCV 92.7 fL      MCH 30.7 pg      MCHC 33.2 g/dL      RDW 13.7 %      RDW-SD 46.8 fl      MPV 10.0 fL      Platelets 179 10*3/mm3      Neutrophil % 70.4 %      Lymphocyte % 19.9 %      Monocyte % 5.3 %      Eosinophil % 3.6 %      Basophil % 0.5 %      Immature Grans % 0.3 %      Neutrophils, Absolute 4.35 10*3/mm3      Lymphocytes, Absolute 1.23 10*3/mm3      Monocytes, Absolute 0.33 10*3/mm3      Eosinophils, Absolute 0.22 10*3/mm3      Basophils, Absolute 0.03 10*3/mm3      Immature Grans, Absolute 0.02 10*3/mm3      nRBC 0.0 /100 WBC     Troponin [[id number]]  (Normal) Collected: 10/04/20 2052    Specimen: Blood Updated: 10/05/20 0023     Troponin T <0.010 ng/mL     Narrative:      Troponin T Reference Range:  <= 0.03 ng/mL-   Negative for AMI  >0.03 ng/mL-     Abnormal for myocardial necrosis.  Clinicians would have to utilize clinical acumen, EKG, Troponin and serial changes to determine if it is an Acute Myocardial Infarction or myocardial injury due to an underlying chronic condition.       Results may be falsely decreased if patient taking Biotin.      BNP [[id number]]  (Normal) Collected: 10/04/20 2052    Specimen: Blood Updated: 10/05/20 0023     proBNP 503.3 pg/mL     Narrative:      Among patients with dyspnea, NT-proBNP is highly sensitive for the detection of acute congestive heart failure. In addition NT-proBNP of <300 pg/ml effectively rules out acute congestive heart failure with 99% negative predictive value.    Results may be falsely decreased if patient taking Biotin.      COVID PRE-OP / PRE-PROCEDURE SCREENING ORDER (NO ISOLATION) - Swab, Nasopharynx [[id number]]  (Normal) Collected: 10/04/20 2350    Specimen: Swab from Nasopharynx Updated: 10/05/20 0105    Narrative:      The following orders were created for panel order COVID PRE-OP / PRE-PROCEDURE SCREENING ORDER (NO ISOLATION) - Swab, Nasopharynx.  Procedure                                Abnormality         Status                     ---------                               -----------         ------                     Respiratory Panel PCR w/...[[id number]]  Normal              Final result                 Please view results for these tests on the individual orders.    Respiratory Panel PCR w/COVID-19(SARS-CoV-2) JOE/ABDOULAYE/FOX/PAD/COR/MAD In-House, NP Swab in UTM/VTM, 3-4 HR TAT - Swab, Nasopharynx [[id number]]  (Normal) Collected: 10/04/20 2350    Specimen: Swab from Nasopharynx Updated: 10/05/20 0105     ADENOVIRUS, PCR Not Detected     Coronavirus 229E Not Detected     Coronavirus HKU1 Not Detected     Coronavirus NL63 Not Detected     Coronavirus OC43 Not Detected     COVID19 Not Detected     Human Metapneumovirus Not Detected     Human Rhinovirus/Enterovirus Not Detected     Influenza A PCR Not Detected     Influenza A H1 Not Detected     Influenza A H1 2009 PCR Not Detected     Influenza A H3 Not Detected     Influenza B PCR Not Detected     Parainfluenza Virus 1 Not Detected     Parainfluenza Virus 2 Not Detected     Parainfluenza Virus 3 Not Detected     Parainfluenza Virus 4 Not Detected     RSV, PCR Not Detected     Bordetella pertussis pcr Not Detected     Bordetella parapertussis PCR Not Detected     Chlamydophila pneumoniae PCR Not Detected     Mycoplasma pneumo by PCR Not Detected    Narrative:      Fact sheet for providers: https://[employer]/wp-content/uploads/KAU7118-3751-ZY2.1-EUA-Provider-Fact-Sheet-3.pdf    Fact sheet for patients: https://[employer]/wp-content/uploads/POD6748-0538-PM9.1-EUA-Patient-Fact-Sheet-1.pdf  Fact sheet for providers: https://[employer]/wp-content/uploads/XEJ7623-1590-QQ4.1-EUA-Provider-Fact-Sheet-3.pdf    Fact sheet for patients: https://[address]/wp-content/uploads/FCH7202-8036-TB6.1-EUA-Patient-Fact-Sheet-1.pdf          Imaging Results (Last 24 Hours)     Procedure Component Value Units Date/Time    XR Chest 1 View  [[id number]] Collected: 10/05/20 0005     Updated: 10/05/20 0010    Narrative:      SINGLE VIEW CHEST     HISTORY: Shortness of air     COMPARISON: 12/30/2016     FINDINGS:  Exam is degraded by patient positioning. The patient's left hand  obscures the right lung base. Cardiomegaly is present. There is no  vascular congestion. There is mild elevation of the right hemidiaphragm,  not significantly changed. Proximal left humeral fracture is again seen.  No pneumothorax or pleural effusion is identified. No displaced rib  fractures are seen       Impression:      No acute intrathoracic findings.     This report was finalized on 10/5/2020 12:07 AM by Dr. Kristin Isaacs M.D.       CT Cervical Spine Without Contrast [[id number]] Collected: 10/04/20 2225     Updated: 10/04/20 2235    Narrative:      CT OF THE CERVICAL SPINE WITHOUT CONTRAST     HISTORY: Neck pain following trauma     COMPARISON: None available.     TECHNIQUE: Axial CT imaging was obtained through the cervical spine.  Coronal and sagittal reformatted images were obtained.     FINDINGS:  No acute fracture or subluxation of the cervical spine is identified.  Cervical vertebral body alignment appears within normal limits.  Intervertebral disc space narrowing is noted most significantly at  C6-C7. There is no prevertebral soft tissue swelling.     C2-C3: There is no significant canal stenosis or neural foraminal  narrowing.  C3-C4: There is no significant canal stenosis or neural foraminal  narrowing.  C4-C5: There is no significant canal stenosis or neural foraminal  narrowing.  C5-C6: There is no significant canal stenosis or neural foraminal  narrowing.  C6-C7: There is no significant canal stenosis. There is some mild neural  foraminal narrowing on the left.  C7-T1: There is no significant canal stenosis or neural foraminal  narrowing.     Images through the lung apices are clear.       Impression:      No acute fracture or subluxation identified.        Radiation dose reduction techniques were utilized, including automated  exposure control and exposure modulation based on body size.     This report was finalized on 10/4/2020 10:31 PM by Dr. Kristin Isaacs M.D.       CT Head Without Contrast [[id number]] Collected: 10/04/20 2220     Updated: 10/04/20 2228    Narrative:      CT HEAD WITHOUT CONTRAST     HISTORY: Fall     COMPARISON: None available.     TECHNIQUE: Axial CT imaging was obtained through the brain. No IV  contrast was administered.     FINDINGS:  No acute intracranial hemorrhage is identified. There is diffuse  atrophy. There is periventricular and deep white matter microangiopathic  change. There is no midline shift or mass effect. No calvarial fracture  is seen.       Impression:      No acute intracranial hemorrhage.     Radiation dose reduction techniques were utilized, including automated  exposure control and exposure modulation based on body size.     This report was finalized on 10/4/2020 10:25 PM by Dr. Kristin Isaacs M.D.       XR Shoulder 2+ View Left [[id number]] Collected: 10/04/20 2120     Updated: 10/04/20 2124    Narrative:      2 VIEWS LEFT SHOULDER     HISTORY: Pain after a fall     COMPARISON: None available.     FINDINGS:  There is a comminuted fracture of the proximal left humerus. No  additional fractures are seen. There are degenerative changes involving  the left shoulder and left AC joint.       Impression:      Impacted comminuted proximal left humeral fracture.     This report was finalized on 10/4/2020 9:21 PM by Dr. Kristin Isaacs M.D.                  ECG 12 Lead   Preliminary Result   HEART RATE= 72  bpm   RR Interval= 832  ms   ND Interval= 184  ms   P Horizontal Axis= -16  deg   P Front Axis= 22  deg   QRSD Interval= 89  ms   QT Interval= 419  ms   QRS Axis= -7  deg   T Wave Axis= -1  deg   - BORDERLINE ECG -   Sinus rhythm   Low voltage, precordial leads   LVH by voltage   Borderline T abnormalities, diffuse  leads   Electronically Signed By:    Date and Time of Study: 2020-10-05 00:00:13           Assessment/Plan     Active Hospital Problems    Diagnosis  POA   • **Closed fracture of proximal end of left humerus [S42.202A]  Yes   • Fall [W19.XXXA]  Yes   • Essential hypertension [I10]  Yes   • Hypothyroidism [E03.9]  Yes   • Hyperlipidemia [E78.5]  Yes   • Type 2 diabetes mellitus with hyperglycemia, without long-term current use of insulin (CMS/[facility]) [E11.65]  Yes   • Anemia [D64.9]  Yes      Resolved Hospital Problems   No resolved problems to display.       Ms. Fletcher is a 80 y.o. smoker with a history of hypertension, hypothyroidism, hyperlipidemia, and type 2 diabetes mellitus who presents with mechanical falls and has suffered a left humerus fracture.    Closed fracture of the proximal end of the left humerus  -Orthopedic consulted, CT scan of the left shoulder has been ordered by for further evaluation.  -Patient cleared for diet this a.m.  -Pain medications as needed  -Antiemetics as needed  -PT to treat and eval  -Await orthopedic recommendations    Acute hypoxia  -Resolved upon exam, I actually believe her hypoxic event in the ED was secondary to IV pain medication administration.  -Continue to monitor    Type 2 diabetes mellitus  -Accu-Cheks 4 times a day with meals and at bedtime  -Moderate dose sliding scale insulin with hypoglycemic protocol  -Check hemoglobin A1c  -Hold oral diabetic medications for now    Hypertension  -Blood pressure controlled, continue home regimen    Hyperlipidemia  -Continue statin therapy    Hypothyroidism  -Continue Synthroid      I discussed the patient's findings and my recommendations with patient.    VTE Prophylaxis - SCDs.  Code Status - Full code.       ALMA Smiht  [facility]  10/05/20  04:30 EDT

## 2020-10-05 NOTE — PROGRESS NOTES
Continued Stay Note  [facility]     Patient Name: Lisbet Fletcher  MRN: [id number]  Today's Date: 10/5/2020    Admit Date: 10/4/2020    Discharge Plan     Row Name 10/05/20 0935       Plan    Plan  Plan home with family.   TRELL Mosher RN    Patient/Family in Agreement with Plan  yes    Plan Comments  FACE SHEET VERIFIED/ MARISCAL SIGNED.  Spoke with pt at bedside.  Pt's PCP is ALMA Cunningham.  Pt lives in a single story duplex with granddaughter  (Ifeoma 16y/o) and grandson ( Renny 12 y/o).  Pt is independent with ADL's.  Pt has a grab bar, glucometer, shower chair and rolling walker for home use.  Pt gets prescriptions at [facility]  ([facility]  Pt states if she has issues paying for her medications her granddaughter will assist her.  Pt is not current with HH.  Pt has not been in SNF.  Pt denies any discharge needs.  Plan home with family.  TRELL Mosher RN        Discharge Codes    No documentation.             Kalie Moshre RN

## 2020-10-05 NOTE — PLAN OF CARE
Goal Outcome Evaluation:  Plan of Care Reviewed With: patient  Progress: no change  Outcome Summary: New admit for fall and fracture of left humerus.  Left arm in sling.  Bruised nose and left knee.  Abrasion right knee.  NPO until orthopedic surgery sees today.  Consult called.

## 2020-10-05 NOTE — ED NOTES
Pt to ED from home via EMS c/o left shoulder pain following a fall. Per EMS, appears to be dislocated. Sling + swath applied by EMS. Pt denies hitting her head, takes daily asa. Pt alert, answering questions appropriately in triage. Pt masked, triage staff wearing appropriate PPE including mask, gloves, eye protection.      Sindy Garcia, MATTI  10/04/20 2037       Sindy Garcia RN  10/04/20 2040

## 2020-10-05 NOTE — PROGRESS NOTES
Discharge Planning Assessment  [facility]     Patient Name: Lisbet Fletcher  MRN: [id number]  Today's Date: 10/5/2020    Admit Date: 10/4/2020    Discharge Needs Assessment     Row Name 10/05/20 0932       Living Environment    Lives With  grandchild(maría elena)    Name(s) of Who Lives With Patient  Granddaughter  (Ifeoma 16y/o) and grandson ( Renny)    Current Living Arrangements  home/apartment/condo    Primary Care Provided by  self    Provides Primary Care For  grandchild(maría elena)    Family Caregiver if Needed  grandchild(maría elena), adult    Family Caregiver Names  Granddaughter  (Ilene Farley)    Quality of Family Relationships  helpful;involved;supportive    Able to Return to Prior Arrangements  yes    Living Arrangement Comments  Pt lives in a single story duplex with granddaughter  (Ifeoma 16y/o) and grandson ( Renny)       Resource/Environmental Concerns    Resource/Environmental Concerns  none    Transportation Concerns  car, none       Transition Planning    Patient/Family Anticipated Services at Transition  none    Transportation Anticipated  family or friend will provide       Discharge Needs Assessment    Readmission Within the Last 30 Days  no previous admission in last 30 days    Equipment Currently Used at Home  glucometer;grab bar;shower chair;walker, rolling    Concerns to be Addressed  denies needs/concerns at this time    Anticipated Changes Related to Illness  none    Equipment Needed After Discharge  grab bar, tub/shower;glucometer;shower chair;walker, rolling        Discharge Plan     Row Name 10/05/20 0935       Plan    Plan  Plan home with family.   TRELL Mosher RN    Patient/Family in Agreement with Plan  yes    Plan Comments  FACE SHEET VERIFIED/ MARISCAL SIGNED.  Spoke with pt at bedside.  Pt's PCP is ALMA Cunningham.  Pt lives in a single story duplex with granddaughter  (Ifeoma 16y/o) and grandson ( Renny 12 y/o).  Pt is independent with ADL's.  Pt has a grab bar, glucometer, shower chair and  rolling walker for home use.  Pt gets prescriptions at [facility]  ([facility]).  Pt states if she has issues paying for her medications her granddaughter will assist her.  Pt is not current with HH.  Pt has not been in SNF.  Pt denies any discharge needs.  Plan home with family.  TRELL Mosher RN        Continued Care and Services - Admitted Since 10/4/2020    Coordination has not been started for this encounter.         Demographic Summary     Row Name 10/05/20 0931       General Information    Admission Type  observation    Arrived From  emergency department    Required Notices Provided  Observation Status Notice    Referral Source  admission list    Reason for Consult  discharge planning    Preferred Language  English     Used During This Interaction  no        Functional Status     Row Name 10/05/20 0932       Functional Status    Usual Activity Tolerance  good    Current Activity Tolerance  moderate       Functional Status, IADL    Medications  independent    Meal Preparation  independent    Housekeeping  assistive person    Laundry  assistive person    Shopping  assistive person       Mental Status    General Appearance WDL  WDL       Mental Status Summary    Recent Changes in Mental Status/Cognitive Functioning  no changes        Psychosocial    No documentation.       Abuse/Neglect    No documentation.       Legal    No documentation.       Substance Abuse    No documentation.       Patient Forms    No documentation.           Kalie Mosher, RN

## 2020-10-05 NOTE — PLAN OF CARE
Problem: Adult Inpatient Plan of Care  Goal: Plan of Care Review  10/5/2020 1642 by Gisella Martines RN  Flowsheets (Taken 10/5/2020 1642)  Outcome Summary: PT AAOX4, Norco Q4 for pain , IV Fliuds, NPO after mighnight due to possibilty of surgery tomorrow. Assist X1 to restroom.  10/5/2020 1457 by Gisella Martines RN  Outcome: Ongoing, Progressing  10/5/2020 1455 by Gisella Martines RN  Outcome: Ongoing, Progressing  Goal: Patient-Specific Goal (Individualized)  10/5/2020 1457 by Gisella Martines RN  Outcome: Ongoing, Progressing  10/5/2020 1455 by Gisella Martines RN  Outcome: Ongoing, Progressing  Goal: Absence of Hospital-Acquired Illness or Injury  10/5/2020 1457 by Gisella Martines RN  Outcome: Ongoing, Progressing  10/5/2020 1455 by Gisella Martines RN  Outcome: Ongoing, Progressing  Intervention: Identify and Manage Fall Risk  Recent Flowsheet Documentation  Taken 10/5/2020 1400 by Gisella Martines RN  Safety Promotion/Fall Prevention:   assistive device/personal items within reach   clutter free environment maintained   nonskid shoes/slippers when out of bed  Taken 10/5/2020 1200 by Gisella Martines RN  Safety Promotion/Fall Prevention: assistive device/personal items within reach  Taken 10/5/2020 1000 by Gisella Martines RN  Safety Promotion/Fall Prevention:   assistive device/personal items within reach   clutter free environment maintained   nonskid shoes/slippers when out of bed  Intervention: Prevent Skin Injury  Recent Flowsheet Documentation  Taken 10/5/2020 1400 by Gisella Martines RN  Body Position: position changed independently  Taken 10/5/2020 1200 by Gisella Martines RN  Body Position: position changed independently  Taken 10/5/2020 1000 by Gisella Martines RN  Body Position: position changed independently  Intervention: Prevent Infection  Recent Flowsheet Documentation  Taken 10/5/2020 1400 by Gisella Martines RN  Infection Prevention:   hand hygiene promoted   environmental  surveillance performed  Taken 10/5/2020 1200 by Gisella Martines RN  Infection Prevention: rest/sleep promoted  Taken 10/5/2020 1000 by Gisella Martines RN  Infection Prevention: hand hygiene promoted  Goal: Optimal Comfort and Wellbeing  10/5/2020 1457 by Gisella Martines RN  Outcome: Ongoing, Progressing  10/5/2020 1455 by Gisella Martines RN  Outcome: Ongoing, Progressing  Intervention: Provide Person-Centered Care  Recent Flowsheet Documentation  Taken 10/5/2020 1400 by Gisella Martines RN  Trust Relationship/Rapport:   care explained   choices provided   questions encouraged  Goal: Readiness for Transition of Care  10/5/2020 1457 by Gisella Martines RN  Outcome: Ongoing, Progressing  10/5/2020 1455 by Gisella Martines RN  Outcome: Ongoing, Progressing   Goal Outcome Evaluation:  Plan of Care Reviewed With: patient  Progress: no change  Outcome Summary: PT AAOX4, Norco Q4 for pain , IV Fliuds, NPO after mighnight due to possibilty of surgery tomorrow. Assist X1 to restroom.

## 2020-10-06 ENCOUNTER — NURSE TRIAGE (OUTPATIENT)
Dept: CALL CENTER | Facility: HOSPITAL | Age: 80
End: 2020-10-06

## 2020-10-06 VITALS
WEIGHT: 153.66 LBS | HEIGHT: 62 IN | RESPIRATION RATE: 16 BRPM | TEMPERATURE: 98.9 F | OXYGEN SATURATION: 95 % | SYSTOLIC BLOOD PRESSURE: 134 MMHG | HEART RATE: 91 BPM | BODY MASS INDEX: 28.28 KG/M2 | DIASTOLIC BLOOD PRESSURE: 59 MMHG

## 2020-10-06 LAB
GLUCOSE BLDC GLUCOMTR-MCNC: 117 MG/DL (ref 70–130)
GLUCOSE BLDC GLUCOMTR-MCNC: 133 MG/DL (ref 70–130)

## 2020-10-06 PROCEDURE — 96361 HYDRATE IV INFUSION ADD-ON: CPT

## 2020-10-06 PROCEDURE — 82962 GLUCOSE BLOOD TEST: CPT

## 2020-10-06 PROCEDURE — G0378 HOSPITAL OBSERVATION PER HR: HCPCS

## 2020-10-06 RX ORDER — HYDROCODONE BITARTRATE AND ACETAMINOPHEN 7.5; 325 MG/1; MG/1
1 TABLET ORAL EVERY 4 HOURS PRN
Qty: 24 TABLET | Refills: 0 | Status: SHIPPED | OUTPATIENT
Start: 2020-10-06 | End: 2020-10-12

## 2020-10-06 RX ADMIN — GABAPENTIN 800 MG: 400 CAPSULE ORAL at 06:27

## 2020-10-06 RX ADMIN — HYDROCODONE BITARTRATE AND ACETAMINOPHEN 1 TABLET: 7.5; 325 TABLET ORAL at 06:27

## 2020-10-06 RX ADMIN — CARVEDILOL 6.25 MG: 6.25 TABLET, FILM COATED ORAL at 09:36

## 2020-10-06 RX ADMIN — ASPIRIN 325 MG: 325 TABLET ORAL at 09:36

## 2020-10-06 RX ADMIN — PRAVASTATIN SODIUM 40 MG: 40 TABLET ORAL at 09:36

## 2020-10-06 RX ADMIN — LEVOTHYROXINE SODIUM 88 MCG: 88 TABLET ORAL at 06:27

## 2020-10-06 NOTE — PROGRESS NOTES
Continued Stay Note  [facility]     Patient Name: Lisbet Fletcher  MRN: [id number]  Today's Date: 10/6/2020    Admit Date: 10/4/2020    Discharge Plan     Row Name 10/06/20 1132       Plan    Plan  Plan home with family.  TRELL Mosher RN    Patient/Family in Agreement with Plan  yes    Plan Comments  Spoke with pt at bedside.   Informed pt that MD stated she would benefit from HH.  Encouraged pt to consider HH.  Pt refuses a referral to HH.  Plan home with family.  TRELL Mosher RN        Discharge Codes    No documentation.       Expected Discharge Date and Time     Expected Discharge Date Expected Discharge Time    Oct 6, 2020             Kalie Mosher RN

## 2020-10-06 NOTE — PROGRESS NOTES
Case Management Discharge Note      Final Note: Pt discharged home with family.   TRELL Mosher RN         Selected Continued Care - Discharged on 10/6/2020 Admission date: 10/4/2020 - Discharge disposition: Home-Health Care c    Destination    No services have been selected for the patient.              Durable Medical Equipment    No services have been selected for the patient.              Dialysis/Infusion    No services have been selected for the patient.              Home Medical Care    No services have been selected for the patient.              Therapy    No services have been selected for the patient.              Community Resources    No services have been selected for the patient.                  Transportation Services  Private: Car    Final Discharge Disposition Code: 01 - home or self-care

## 2020-10-06 NOTE — OUTREACH NOTE
Case Management Call Center Follow-up      Responses   [facility] Call Center Tracking Reason?  Medication Clarification   Has the Call Center Case Management Follow-up issue been resolved?  Yes   Follow-up Comments  Notes reviewed.  Call placed to [facility] and spoke with Clarisse.  She states that she delivered the medication to Ms. Fletcher prior to her discharge.  Call placed to pt at her listed number.  She states that she has now found the medication in question.  She was appreciative.          Tayler Torres RN    10/6/2020, 14:54 CDT

## 2020-10-06 NOTE — PLAN OF CARE
Goal Outcome Evaluation:  Plan of Care Reviewed With: patient  Progress: no change  Outcome Summary: NPO for possible OR today.  C/O left shoulder to elbow pain.  Ambulating to BR with assist of 1.

## 2020-10-06 NOTE — DISCHARGE INSTRUCTIONS
Rest injured arm in a shoulder sling for comfort  Ice pack shoulder for 20 minutes three times a day for 5 days  Remain Non-Weight bearing on the injured arm  OK for ADLs like eating and dressing when able to tolerate  Encourage active finger , elbow and shoulder pendulum range of motion when able to tolerate  OK to remove sling for showers and personal hygeine  Keep axilla clean and dry

## 2020-10-06 NOTE — PLAN OF CARE
Goal Outcome Evaluation:  Plan of Care Reviewed With: patient  Progress: no change   Patient discharged home with family. Patient instructed on f/u with ortho. Questions answered.

## 2020-10-06 NOTE — DISCHARGE SUMMARY
Patient Name: Lisbet Fletcher  : 1940  MRN: [id number]    Date of Admission: 10/4/2020  Date of Discharge:  10/6/2020  Primary Care Physician: Maribel Foss APRN      Chief Complaint:   Fall and Shoulder Injury      Discharge Diagnoses     Active Hospital Problems    Diagnosis  POA   • **Closed fracture of proximal end of left humerus [S42.202A]  Yes   • Fall [W19.XXXA]  Yes   • Essential hypertension [I10]  Yes   • Hypothyroidism [E03.9]  Yes   • Hyperlipidemia [E78.5]  Yes   • Type 2 diabetes mellitus with hyperglycemia, without long-term current use of insulin (CMS/[facility]) [E11.65]  Yes   • Anemia [D64.9]  Yes      Resolved Hospital Problems   No resolved problems to display.        Hospital Course     Ms. Fletcher is a 80 y.o. female with a history of hypertension, hypothyroidism, hyperlipidemia, and type 2 diabetes who presented to [facility] initially complaining of shoulder and arm pain after a fall.  Please see the admitting history and physical for further details.  She was found to have closed fracture of the proximal end of the left humerus and was admitted to the hospital for further evaluation and treatment.  She was walking around outside and tripped over her feet landed on her left side.  She had immediate pain and discomfort in the left arm and came to the hospital where she was found to have a left humeral head fracture.  She was placed in a sling and admitted to the hospital after she had some intermittent hypoxia when they attempted to ambulate her in the emergency room.  They just given her IV pain medication when her oxygen saturations dipped and I think this is likely etiology.  She was monitored here in the hospital for 2 days and had no further hypoxic episodes.  Orthopedic surgery evaluated the patient after admission and recommended a CT scan to get a better picture of the fractures.  After evaluation of the imaging they recommended nonoperative management and follow-up  in the outpatient setting.  The plan was discussed with the patient and recommendations were made for home health OT and PT evaluations for home safety.  The family and patient refused home health services.  She is been discharged in stable and satisfactory condition with medication sent to her pharmacy.      Day of Discharge     She is feeling fine today and wants to go home.  Pain is controlled.  She is been cleared for discharge by orthopedics    Physical Exam:  Temp:  [98.3 °F (36.8 °C)-98.9 °F (37.2 °C)] 98.9 °F (37.2 °C)  Heart Rate:  [79-91] 91  Resp:  [16] 16  BP: (115-134)/(55-61) 134/59  Body mass index is 28.1 kg/m².  Physical Exam  Vitals signs and nursing note reviewed.   Constitutional:       Appearance: Normal appearance.   HENT:      Head: Normocephalic and atraumatic.   Cardiovascular:      Rate and Rhythm: Normal rate and regular rhythm.   Pulmonary:      Effort: Pulmonary effort is normal.      Breath sounds: Normal breath sounds.   Abdominal:      General: There is no distension.      Palpations: Abdomen is soft.   Musculoskeletal:         General: Swelling, tenderness and deformity present.      Comments: Left arm is in a sling   Skin:     General: Skin is warm and dry.      Capillary Refill: Capillary refill takes less than 2 seconds.   Neurological:      General: No focal deficit present.      Mental Status: She is alert and oriented to person, place, and time.         Consultants     Consult Orders (all) (From admission, onward)     Start     Ordered    10/05/20 0249  Inpatient Case Management  Consult  Once     Provider:  (Not yet assigned)    10/05/20 0249    10/05/20 0149  Inpatient Orthopedic Surgery Consult  Once     Specialty:  Orthopedic Surgery  Provider:  Jacobo Vila MD    10/05/20 0148    10/05/20 0128  Inpatient Orthopedic Surgery Consult  Once,   Status:  Canceled     Specialty:  Orthopedic Surgery  Provider:  (Not yet assigned)    10/05/20 0128    10/05/20 0031   [facility] (on-call MD unless specified) Details  Once     Specialty:  Hospitalist  Provider:  (Not yet assigned)    10/05/20 0031    10/04/20 2327  Ortho (on-call MD unless specified)  Once     Specialty:  Orthopedic Surgery  Provider:  (Not yet assigned)    10/04/20 2326              Procedures     Imaging Results (All)     Procedure Component Value Units Date/Time    CT Upper Extremity Left Without Contrast [[id number]] Collected: 10/05/20 1648     Updated: 10/05/20 1704    Narrative:      CT LEFT SHOULDER WITHOUT CONTRAST     HISTORY: Left shoulder fracture. Trauma     TECHNIQUE: CT includes axial imaging from above the AC joint to the  proximal humeral shaft and this data was reconstructed in coronal and  sagittal planes.     COMPARISON: Left shoulder x-rays 10/04/2020.     FINDINGS: There is a transverse proximal humeral metaphyseal fracture  associated with impaction and anterior lateral displacement 18 mm. There  is comminution with several cortical fragments along the fracture  margins. This fracture extends superiorly and through the greater  tuberosity where there is comminution multiple fracture fragments. The  rotator cuff tendons insert on the fracture fragments. Fracture extends  into the bicipital groove and extends to the margin of the articular  surface without clear extension to the articular surface or displacement  at the articular surface. The glenoid and scapula and left clavicle are  intact. There is a cluster of micronodules within the anterior left  upper lobe that is most likely inflammatory or infectious in etiology.  This appears to be new when compared to chest CT 04/27/2017.     There is edema within subcutaneous fat about the left shoulder and there  is edema/hemorrhage extending to the left axilla.       Impression:      1. Comminuted, impacted proximal humeral metaphyseal fracture with  displacement. Fracture is contiguous with a comminuted fracture through  the greater tuberosity and  extending into the bicipital groove. Fracture  spares the humeral head articular surface and there is no dislocation or  scapular fracture.  2. Large glenohumeral joint effusion. Edema within the subcutaneous fat  throughout the left shoulder and extending into the left axilla.  3. Cluster of micronodular opacities within the anterolateral left upper  lobe is most likely inflammatory or infectious in etiology. This is new  compared to chest CT 04/27/2017.     Radiation dose reduction techniques were utilized, including automated  exposure control and exposure modulation based on body size.     This report was finalized on 10/5/2020 5:01 PM by Dr. Vincenzo Johns M.D.       XR Chest 1 View [[id number]] Collected: 10/05/20 0005     Updated: 10/05/20 0010    Narrative:      SINGLE VIEW CHEST     HISTORY: Shortness of air     COMPARISON: 12/30/2016     FINDINGS:  Exam is degraded by patient positioning. The patient's left hand  obscures the right lung base. Cardiomegaly is present. There is no  vascular congestion. There is mild elevation of the right hemidiaphragm,  not significantly changed. Proximal left humeral fracture is again seen.  No pneumothorax or pleural effusion is identified. No displaced rib  fractures are seen       Impression:      No acute intrathoracic findings.     This report was finalized on 10/5/2020 12:07 AM by Dr. Kristin Isaacs M.D.       CT Cervical Spine Without Contrast [[id number]] Collected: 10/04/20 2225     Updated: 10/04/20 2235    Narrative:      CT OF THE CERVICAL SPINE WITHOUT CONTRAST     HISTORY: Neck pain following trauma     COMPARISON: None available.     TECHNIQUE: Axial CT imaging was obtained through the cervical spine.  Coronal and sagittal reformatted images were obtained.     FINDINGS:  No acute fracture or subluxation of the cervical spine is identified.  Cervical vertebral body alignment appears within normal limits.  Intervertebral disc space narrowing is noted most  significantly at  C6-C7. There is no prevertebral soft tissue swelling.     C2-C3: There is no significant canal stenosis or neural foraminal  narrowing.  C3-C4: There is no significant canal stenosis or neural foraminal  narrowing.  C4-C5: There is no significant canal stenosis or neural foraminal  narrowing.  C5-C6: There is no significant canal stenosis or neural foraminal  narrowing.  C6-C7: There is no significant canal stenosis. There is some mild neural  foraminal narrowing on the left.  C7-T1: There is no significant canal stenosis or neural foraminal  narrowing.     Images through the lung apices are clear.       Impression:      No acute fracture or subluxation identified.     Radiation dose reduction techniques were utilized, including automated  exposure control and exposure modulation based on body size.     This report was finalized on 10/4/2020 10:31 PM by Dr. Kristin Isaacs M.D.       CT Head Without Contrast [[id number]] Collected: 10/04/20 2220     Updated: 10/04/20 2228    Narrative:      CT HEAD WITHOUT CONTRAST     HISTORY: Fall     COMPARISON: None available.     TECHNIQUE: Axial CT imaging was obtained through the brain. No IV  contrast was administered.     FINDINGS:  No acute intracranial hemorrhage is identified. There is diffuse  atrophy. There is periventricular and deep white matter microangiopathic  change. There is no midline shift or mass effect. No calvarial fracture  is seen.       Impression:      No acute intracranial hemorrhage.     Radiation dose reduction techniques were utilized, including automated  exposure control and exposure modulation based on body size.     This report was finalized on 10/4/2020 10:25 PM by Dr. Kristin Isaacs M.D.       XR Shoulder 2+ View Left [[id number]] Collected: 10/04/20 2120     Updated: 10/04/20 2124    Narrative:      2 VIEWS LEFT SHOULDER     HISTORY: Pain after a fall     COMPARISON: None available.     FINDINGS:  There is a comminuted  fracture of the proximal left humerus. No  additional fractures are seen. There are degenerative changes involving  the left shoulder and left AC joint.       Impression:      Impacted comminuted proximal left humeral fracture.     This report was finalized on 10/4/2020 9:21 PM by Dr. Kristin Isaacs M.D.             Pertinent Labs     Results from last 7 days   Lab Units 10/05/20  0858 10/04/20  2052   WBC 10*3/mm3 6.77 6.18   HEMOGLOBIN g/dL 10.8* 12.2   PLATELETS 10*3/mm3 199 179     Results from last 7 days   Lab Units 10/05/20  0858 10/04/20  2052   SODIUM mmol/L 140 142   POTASSIUM mmol/L 3.4* 3.5   CHLORIDE mmol/L 101 101   CO2 mmol/L 24.7 30.8*   BUN mg/dL 14 14   CREATININE mg/dL 0.73 0.86   GLUCOSE mg/dL 179* 191*   Estimated Creatinine Clearance: 51.3 mL/min (by C-G formula based on SCr of 0.73 mg/dL).  Results from last 7 days   Lab Units 10/04/20  2052   ALBUMIN g/dL 3.90   BILIRUBIN mg/dL 0.3   ALK PHOS U/L 56   AST (SGOT) U/L 16   ALT (SGPT) U/L 12     Results from last 7 days   Lab Units 10/05/20  0858 10/04/20  2052   CALCIUM mg/dL 8.2* 8.7   ALBUMIN g/dL  --  3.90       Results from last 7 days   Lab Units 10/04/20  2052   TROPONIN T ng/mL <0.010   PROBNP pg/mL 503.3           Invalid input(s): LDLCALC        Test Results Pending at Discharge       Discharge Details        Discharge Medications      New Medications      Instructions Start Date   HYDROcodone-acetaminophen 7.5-325 MG per tablet  Commonly known as: NORCO   1 tablet, Oral, Every 4 Hours PRN         Changes to Medications      Instructions Start Date   dicyclomine 20 MG tablet  Commonly known as: BENTYL  What changed:   · when to take this  · reasons to take this   20 mg, Oral, Every 6 Hours         Continue These Medications      Instructions Start Date   aspirin 325 MG tablet   325 mg, Oral, Daily      capecitabine 500 MG chemo tablet  Commonly known as: XELODA   Take 2 tabs (1000 mg) po twice a day for 14 days on then 7 days  off.      carvedilol 6.25 MG tablet  Commonly known as: COREG   6.25 mg, Oral, 2 Times Daily With Meals      colchicine 0.6 MG tablet   0.6 mg, Oral, Every 12 Hours Scheduled, Until pain completely gone in ankle      ergocalciferol 1.25 MG (07505 UT) capsule  Commonly known as: ERGOCALCIFEROL   50,000 Units, Oral      gabapentin 800 MG tablet  Commonly known as: NEURONTIN   800 mg, Oral, 3 Times Daily      levothyroxine 50 MCG tablet  Commonly known as: SYNTHROID, LEVOTHROID   88 mcg, Oral, Daily      metFORMIN 500 MG tablet  Commonly known as: GLUCOPHAGE   500 mg, Oral, 2 Times Daily With Meals      ondansetron 4 MG tablet  Commonly known as: ZOFRAN   4 mg, Oral, Every 8 Hours PRN      Prevnar 13 vaccine  Generic drug: pneumococcal conj. 13-valent   inject 0.5 milliliter intramuscularly      vitamin B-12 100 MCG tablet  Commonly known as: CYANOCOBALAMIN   50 mcg, Oral, Daily      vitamin D3 125 MCG (5000 UT) capsule capsule   5,000 Units, Oral, Daily         Stop These Medications    hydroCHLOROthiazide 25 MG tablet  Commonly known as: HYDRODIURIL        ASK your doctor about these medications      Instructions Start Date   pravastatin 40 MG tablet  Commonly known as: PRAVACHOL   40 mg, Oral, Daily             No Known Allergies      Discharge Disposition:  Home-Health Care Svc    Discharge Diet:  Diet Order   Procedures   • NPO Diet       Discharge Activity:   Activity Instructions     Activity as Tolerated            CODE STATUS:    Code Status and Medical Interventions:   Ordered at: 10/05/20 0128     Code Status:    CPR     Medical Interventions (Level of Support Prior to Arrest):    Full       No future appointments.  Additional Instructions for the Follow-ups that You Need to Schedule     Discharge Follow-up with PCP   As directed       Currently Documented PCP:    Maribel Foss APRN    PCP Phone Number:    [phone]     Follow Up Details: 2-4 weeks         Discharge Follow-up with Specified Provider:  Dr Amaya; 2 Weeks   As directed      To: Dr Amaya    Follow Up: 2 Weeks         Referral to Home Health   As directed      Face to Face Visit Date: 10/6/2020    Follow-up provider for Plan of Care?: I treated the patient in an acute care facility and will not continue treatment after discharge.    Follow-up provider: LUKE FOSS [[id number]]    Reason/Clinical Findings: left humerus fracture    Describe mobility limitations that make leaving home difficult: limited mobility    Nursing/Therapeutic Services Requested: Occupational Therapy Physical Therapy    PT orders: Home safety assessment    Occupational orders: Home safety assessment    Frequency: 1 Week 1           Follow-up Information     Michael Amaya MD Follow up on 10/29/2020.    Specialty: Orthopedic Surgery  Contact information:  [address] [facility] [facility]  [address]  [place] [id number] [phone]             Luke Foss APRN .    Specialty: Nurse Practitioner  Why: 2-4 weeks  Contact information:  [address]  [facility] [id number] [phone]                   Additional Instructions for the Follow-ups that You Need to Schedule     Discharge Follow-up with PCP   As directed       Currently Documented PCP:    Luke Foss APRN    PCP Phone Number:    [phone]     Follow Up Details: 2-4 weeks         Discharge Follow-up with Specified Provider: Dr Amaya; 2 Weeks   As directed      To: Dr Amaya    Follow Up: 2 Weeks         Referral to Home Health   As directed      Face to Face Visit Date: 10/6/2020    Follow-up provider for Plan of Care?: I treated the patient in an acute care facility and will not continue treatment after discharge.    Follow-up provider: LUKE FOSS [[id number]]    Reason/Clinical Findings: left humerus fracture    Describe mobility limitations that make leaving home difficult: limited mobility    Nursing/Therapeutic Services Requested: Occupational Therapy Physical Therapy    PT orders: Home  safety assessment    Occupational orders: Home safety assessment    Frequency: 1 Week 1           Time Spent on Discharge:  Greater than 30 minutes      Stefan Yi MD  [facility]  10/06/20  11:15 EDT

## 2020-10-06 NOTE — PROGRESS NOTES
I have reviewed the patient's left shoulder CT scans.  She has a valgus impacted surgical neck humerus fracture.  I have recommended nonoperative management.  She will follow-up in the office in 2 weeks for follow-up x-rays.  If there is any displacement of the fracture then we will consider surgical intervention.  She is likely to lose some overhead range of motion permanently.  She will remain nonweightbearing on the left upper extremity.Rest injured arm in a shoulder sling for comfort  Ice pack shoulder for 20 minutes three times a day for 5 days  Remain Non-Weight bearing on the injured arm  OK for ADLs like eating and dressing when able to tolerate  Encourage active finger , elbow and shoulder pendulum range of motion when able to tolerate  OK to remove sling for showers and personal hygeine  Keep axilla clean and dry

## 2020-10-06 NOTE — TELEPHONE ENCOUNTER
"Caller states she did not see note to  prescription at [facility] pharmacy until they arrived home. Asking that prescription be sent to [facility]. Advised I will forward request to Case Management       Reason for Disposition  • [1] Request for URGENT new prescription or refill of \"essential\" medication (i.e., likelihood of harm to patient if not taken) AND [2] triager unable to fill per unit policy    Additional Information  • Negative: Drug overdose and triager unable to answer question  • Negative: Caller requesting information unrelated to medicine  • Negative: Caller requesting a prescription for Strep throat and has a positive culture result  • Negative: Rash while taking a medication or within 3 days of stopping it  • Negative: Immunization reaction suspected  • Negative: [1] Asthma and [2] having symptoms of asthma (cough, wheezing, etc.)  • Negative: [1] Influenza symptoms AND [2] anti-viral med prescription request, such as Tamiflu  • Negative: [1] Symptom of illness (e.g., headache, abdominal pain, earache, vomiting) AND [2] more than mild  • Negative: MORE THAN A DOUBLE DOSE of a prescription or over-the-counter (OTC) drug  • Negative: [1] DOUBLE DOSE (an extra dose or lesser amount) of over-the-counter (OTC) drug AND [2] any symptoms (e.g., dizziness, nausea, pain, sleepiness)  • Negative: [1] DOUBLE DOSE (an extra dose or lesser amount) of prescription drug AND [2] any symptoms (e.g., dizziness, nausea, pain, sleepiness)  • Negative: Took another person's prescription drug  • Negative: [1] DOUBLE DOSE (an extra dose or lesser amount) of prescription drug AND [2] NO symptoms (Exception: a double dose of antibiotics)  • Negative: Diabetes drug error or overdose (e.g., took wrong type of insulin or took extra dose)    Answer Assessment - Initial Assessment Questions  1.   NAME of MEDICATION: \"What medicine are you calling about?\"      Hydrocodone/APAP 7.5/350    2.   QUESTION: " "\"What is your question?\"      Can you send this prescription to [facility]  3.   PRESCRIBING HCP: \"Who prescribed it?\" Reason: if prescribed by specialist, call should be referred to that group.      hospitalist  4. SYMPTOMS: \"Do you have any symptoms?\"      pain  5. SEVERITY: If symptoms are present, ask \"Are they mild, moderate or severe?\"      moderate  6.  PREGNANCY:  \"Is there any chance that you are pregnant?\" \"When was your last menstrual period?\"     na    Protocols used: MEDICATION QUESTION CALL-ADULT-AH      "

## 2023-02-09 ENCOUNTER — LAB (OUTPATIENT)
Dept: LAB | Facility: HOSPITAL | Age: 83
End: 2023-02-09
Payer: MEDICARE

## 2023-02-09 ENCOUNTER — CONSULT (OUTPATIENT)
Dept: ONCOLOGY | Facility: CLINIC | Age: 83
End: 2023-02-09
Payer: MEDICARE

## 2023-02-09 VITALS
BODY MASS INDEX: 25.05 KG/M2 | WEIGHT: 141.4 LBS | DIASTOLIC BLOOD PRESSURE: 60 MMHG | SYSTOLIC BLOOD PRESSURE: 106 MMHG | OXYGEN SATURATION: 93 % | HEART RATE: 81 BPM | HEIGHT: 63 IN | RESPIRATION RATE: 16 BRPM | TEMPERATURE: 97.7 F

## 2023-02-09 DIAGNOSIS — C18.9 MALIGNANT NEOPLASM OF COLON, UNSPECIFIED PART OF COLON: Primary | ICD-10-CM

## 2023-02-09 DIAGNOSIS — C18.2 MALIGNANT NEOPLASM OF ASCENDING COLON: Primary | ICD-10-CM

## 2023-02-09 LAB
ALBUMIN SERPL-MCNC: 4.2 G/DL (ref 3.5–5.2)
ALBUMIN/GLOB SERPL: 1.3 G/DL
ALP SERPL-CCNC: 82 U/L (ref 39–117)
ALT SERPL W P-5'-P-CCNC: 12 U/L (ref 1–33)
ANION GAP SERPL CALCULATED.3IONS-SCNC: 10 MMOL/L (ref 5–15)
AST SERPL-CCNC: 15 U/L (ref 1–32)
BASOPHILS # BLD AUTO: 0.04 10*3/MM3 (ref 0–0.2)
BASOPHILS NFR BLD AUTO: 0.5 % (ref 0–1.5)
BILIRUB SERPL-MCNC: 0.3 MG/DL (ref 0–1.2)
BUN SERPL-MCNC: 16 MG/DL (ref 8–23)
BUN/CREAT SERPL: 19.5 (ref 7–25)
CALCIUM SPEC-SCNC: 9.4 MG/DL (ref 8.6–10.5)
CEA SERPL-MCNC: 1.72 NG/ML
CHLORIDE SERPL-SCNC: 97 MMOL/L (ref 98–107)
CO2 SERPL-SCNC: 34 MMOL/L (ref 22–29)
CREAT SERPL-MCNC: 0.82 MG/DL (ref 0.57–1)
DEPRECATED RDW RBC AUTO: 45.9 FL (ref 37–54)
EGFRCR SERPLBLD CKD-EPI 2021: 71.5 ML/MIN/1.73
EOSINOPHIL # BLD AUTO: 0.35 10*3/MM3 (ref 0–0.4)
EOSINOPHIL NFR BLD AUTO: 4.5 % (ref 0.3–6.2)
ERYTHROCYTE [DISTWIDTH] IN BLOOD BY AUTOMATED COUNT: 14.7 % (ref 12.3–15.4)
GLOBULIN UR ELPH-MCNC: 3.3 GM/DL
GLUCOSE SERPL-MCNC: 98 MG/DL (ref 65–99)
HCT VFR BLD AUTO: 36.8 % (ref 34–46.6)
HGB BLD-MCNC: 12.6 G/DL (ref 12–15.9)
IMM GRANULOCYTES # BLD AUTO: 0.06 10*3/MM3 (ref 0–0.05)
IMM GRANULOCYTES NFR BLD AUTO: 0.8 % (ref 0–0.5)
LYMPHOCYTES # BLD AUTO: 2.4 10*3/MM3 (ref 0.7–3.1)
LYMPHOCYTES NFR BLD AUTO: 31.2 % (ref 19.6–45.3)
MCH RBC QN AUTO: 29.4 PG (ref 26.6–33)
MCHC RBC AUTO-ENTMCNC: 34.2 G/DL (ref 31.5–35.7)
MCV RBC AUTO: 86 FL (ref 79–97)
MONOCYTES # BLD AUTO: 0.44 10*3/MM3 (ref 0.1–0.9)
MONOCYTES NFR BLD AUTO: 5.7 % (ref 5–12)
NEUTROPHILS NFR BLD AUTO: 4.41 10*3/MM3 (ref 1.7–7)
NEUTROPHILS NFR BLD AUTO: 57.3 % (ref 42.7–76)
NRBC BLD AUTO-RTO: 0 /100 WBC (ref 0–0.2)
PLATELET # BLD AUTO: 233 10*3/MM3 (ref 140–450)
PMV BLD AUTO: 10.2 FL (ref 6–12)
POTASSIUM SERPL-SCNC: 3.5 MMOL/L (ref 3.5–5.2)
PROT SERPL-MCNC: 7.5 G/DL (ref 6–8.5)
RBC # BLD AUTO: 4.28 10*6/MM3 (ref 3.77–5.28)
SODIUM SERPL-SCNC: 141 MMOL/L (ref 136–145)
WBC NRBC COR # BLD: 7.7 10*3/MM3 (ref 3.4–10.8)

## 2023-02-09 PROCEDURE — 36415 COLL VENOUS BLD VENIPUNCTURE: CPT

## 2023-02-09 PROCEDURE — 82378 CARCINOEMBRYONIC ANTIGEN: CPT | Performed by: INTERNAL MEDICINE

## 2023-02-09 PROCEDURE — 99205 OFFICE O/P NEW HI 60 MIN: CPT | Performed by: INTERNAL MEDICINE

## 2023-02-09 PROCEDURE — 85025 COMPLETE CBC W/AUTO DIFF WBC: CPT

## 2023-02-09 PROCEDURE — 80053 COMPREHEN METABOLIC PANEL: CPT | Performed by: INTERNAL MEDICINE

## 2023-02-09 NOTE — PROGRESS NOTES
"Chief Complaint  Stage stage IIIB ([id number]) right colon cancer    Subjective    Patient was referred by ALMA Shaw, for medical oncology consultation regarding the above issue    History of Present Illness    Patient is a 82-year-old female with past medical history significant for CHF, hypertension, hyperlipidemia, hypothyroidism, diabetes mellitus, B12 deficiency, vitamin D deficiency.    The patient was previously seen in our office by Dr. Amezquita for colon cancer.  CT abdomen and pelvis on 12/27/2016 showed long segment dilation and wall thickening of the mid and distal small bowel with enhancement and wall thickening in the terminal ileum and ileocecal valve.  Preoperative CEA was elevated on 12/30/2016 at 37.36.  On 12/31/2016 patient underwent right hemicolectomy.  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  Margins clear.  13 negative lymph nodes.  Solitary focus of tumor in pericecal soft tissue (tumor deposit).  Cholecystectomy performed at the same time, pathology showed chronic cholecystitis and no malignancy.  Dr. Amezquita recommended adjuvant Xeloda chemotherapy in early 2017.  Patient underwent chemo education in February 2017.  There were significant issues with compliance with office visits.  It was unclear when she was seen on 4/27/2017 whether she had been taking Xeloda.  She underwent CT chest abdomen pelvis on 4/27/2017 which showed patchy reticulonodular opacities in the periphery of the right lung that were new, consistent with atypical infection.  There was no evidence of metastatic disease.  Last visit was on 5/9/2017 and patient reported that she had been taking Xeloda however this was unclear.  She did not follow-up thereafter.    The patient has continued follow-up with her primary care provider in the [facility] system.  She was admitted in March 2022 to [facility] with " "rhinovirus infection.  CT did show multifocal pulmonary infiltrates, new noncalcified pulmonary nodules up to 1 cm possibly related to infection, wall thickening consistent with bronchitis in the lower lobes, 2 cm prevascular soft tissue nodule, possibly thymoma with a few additional mildly enlarged mediastinal lymph nodes and evidence of upper abdominal ascites.  She was found on echocardiogram 3/10/2022 to have evidence of CHF with ejection fraction 21%.  Cardiac catheterization 3/16/2022 showed mild coronary artery disease.  Subsequent hospitalization 3/25 - 3/27/2022 due to CHF exacerbation.      The patient today notes that she has had considerable difficulty with chronic diarrhea all of her life.  This predated her colon cancer diagnosis and surgery.  She notes more than 4-5 stools per day.  She has had occasional blood in her stool felt to be related to hemorrhoids.  She has become accustomed to the diarrhea and does not take anything for it.  She is unsure whether this has been evaluated previously.  She does have chronic low back pain and right sciatica and does follow with pain management.  She has declined epidural injections.  She does take gabapentin 800 mg 3 times daily and reports that this is generally effective in controlling her pain.  She reports a normal appetite.  Her living situation is somewhat unclear, reports that she lives alone.  Transportation is an issue for her.  She notes a normal appetite.        Objective   Vital Signs:   /60   Pulse 81   Temp 97.7 °F (36.5 °C) (Temporal)   Resp 16   Ht 159 cm (62.6\") Comment: new ht  Wt 64.1 kg (141 lb 6.4 oz)   SpO2 93%   BMI 25.37 kg/m²     Physical Exam  Constitutional:       Comments: Elderly female no distress   Eyes:      Conjunctiva/sclera: Conjunctivae normal.   Neck:      Thyroid: No thyromegaly.   Cardiovascular:      Rate and Rhythm: Normal rate and regular rhythm.      Heart sounds: No murmur heard.    No friction rub. No " "gallop.   Pulmonary:      Effort: No respiratory distress.      Comments: A few scattered crackles in the left base  Abdominal:      General: Bowel sounds are normal. There is no distension.      Palpations: Abdomen is soft.      Tenderness: There is no abdominal tenderness.   Lymphadenopathy:      Head:      Right side of head: No submandibular adenopathy.      Cervical: No cervical adenopathy.      Upper Body:      Right upper body: No supraclavicular adenopathy.      Left upper body: No supraclavicular adenopathy.   Skin:     General: Skin is warm and dry.      Findings: No rash.   Neurological:      Mental Status: She is alert and oriented to person, place, and time.      Cranial Nerves: No cranial nerve deficit.      Motor: No abnormal muscle tone.      Deep Tendon Reflexes: Reflexes normal.   Psychiatric:         Behavior: Behavior normal.        Result Review : Reviewed PCP records, previous records from hospitalization at [facility], scan reports, pathology reports.       Assessment and Plan     *Stage stage IIIB ([id number]) right colon cancer  · The patient was previously seen in our office by Dr. Amezquita for colon cancer.    · CT abdomen and pelvis on 12/27/2016 showed long segment dilation and wall thickening of the mid and distal small bowel with enhancement and wall thickening in the terminal ileum and ileocecal valve.    · Preoperative CEA was elevated on 12/30/2016 at 37.36.   · On 12/31/2016 patient underwent right hemicolectomy.  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  Margins clear.  13 negative lymph nodes.  Solitary focus of tumor in pericecal soft tissue (tumor deposit).  Cholecystectomy performed at the same time, pathology showed chronic cholecystitis and no malignancy.    · Dr. Amezquita recommended adjuvant Xeloda chemotherapy in early 2017.    · Patient underwent chemo education in " February 2017.   · There were significant issues with compliance with office visits.    · It was unclear when she was seen on 4/27/2017 whether she had been taking Xeloda.    · She underwent CT chest abdomen pelvis on 4/27/2017 which showed patchy reticulonodular opacities in the periphery of the right lung that were new, consistent with atypical infection.  There was no evidence of metastatic disease.    · Last visit was on 5/9/2017 and patient reported that she had been taking Xeloda however this was unclear.  She did not follow-up thereafter.  · Most recent CT chest on 3/16/2022 was performed during hospital admission for rhinovirus infection at [facility].  Scan showed multifocal pulmonary infiltrates, new noncalcified pulmonary nodules up to 1 cm possibly related to infection, wall thickening consistent with bronchitis in the lower lobes, 2 cm prevascular soft tissue nodule, possibly thymoma with a few additional mildly enlarged mediastinal lymph nodes and evidence of upper abdominal ascites.    · Patient has been referred back to our office today to reestablish care and to see whether any additional evaluation is needed given her history of colon cancer.  As noted above, it is unclear by records available and by her history whether she actually took adjuvant Xeloda and how much she received.  She has had no definitive evidence of recurrent disease.  We are now close to 6 years out from her last visit here.  The patient does have some chronic issues in regards to diarrhea that predated her diagnosis of cancer and her colonic surgery and do persist with intermittent episodes of bright blood in her stool, possibly from hemorrhoids.  Etiology of her diarrhea is unclear.  She does not take anything for it.  She has 4-5 loose to watery stools daily by her report.  I did recommend that we refer her back to GI to consider whether colonoscopy should be performed.  She has not undergone colonoscopy by her report since her  diagnosis of colon cancer in late 2016.  She has had no definitive evidence of recurrent disease although there were some questionable findings on CT chest from 3/9/2022.  I did suggest that we obtain a baseline CT chest abdomen pelvis in the next week or so and she does agree.  We will check a CEA level today.  I will see her back in 2 to 3 weeks to review results.  If there is no evidence to suggest recurrent disease at that point, I would not anticipate any further routine radiographic nor CEA monitoring at this point out from her colon cancer (beyond 5 years).    *Chronic diarrhea  · The patient has had considerable difficulty with chronic diarrhea all of her life.  This predated her colon cancer diagnosis and surgery.    · She notes more than 4-5 stools per day.  She has had occasional blood in her stool felt to be related to hemorrhoids.  She has become accustomed to the diarrhea and does not take any medication for the diarrhea.  She is unsure whether this has been evaluated previously.  · As above, we discussed referral to GI for additional evaluation and recommendations on treatment.    *CHF  · Echocardiogram 3/10/2022 showed evidence of CHF with ejection fraction 21%.    · Cardiac catheterization 3/16/2022 showed mild coronary artery disease.    · Subsequent hospitalization 3/25 - 3/27/2022 due to CHF exacerbation.    *Social  · The patient has difficulty with transportation limiting access to care in the past.  I will notify oncology social work to contact the patient regarding potential assistance for transportation to visits.    Plan:  1. Additional labs today with CMP, CEA  2. Referral to GI regarding chronic diarrhea as well as history of colon cancer (no endoscopic evaluation since surgery)  3. In 1 week CT chest abdomen pelvis  4. We will notify oncology social work to contact the patient regarding potential assistance for transportation.  5. In 2 to 3 weeks return visit with CBC, CMP      I did  spend 65 minutes in total time caring for the patient today, time spent in review of records, face-to-face consultation, coordination of care, placement of orders, completion of documentation.

## 2023-02-10 ENCOUNTER — TELEPHONE (OUTPATIENT)
Dept: OTHER | Facility: HOSPITAL | Age: 83
End: 2023-02-10
Payer: MEDICARE

## 2023-02-10 NOTE — TELEPHONE ENCOUNTER
OSW contacted patient's family per referral for assistance with transportation. OSW left a voicemail and will follow-up when needed.

## 2023-02-17 ENCOUNTER — TELEPHONE (OUTPATIENT)
Dept: OTHER | Facility: HOSPITAL | Age: 83
End: 2023-02-17
Payer: MEDICARE

## 2023-02-17 ENCOUNTER — HOSPITAL ENCOUNTER (OUTPATIENT)
Dept: CT IMAGING | Facility: HOSPITAL | Age: 83
Discharge: HOME | End: 2023-02-17
Admitting: INTERNAL MEDICINE
Payer: MEDICARE

## 2023-02-17 DIAGNOSIS — C18.2 MALIGNANT NEOPLASM OF ASCENDING COLON: ICD-10-CM

## 2023-02-17 LAB — CREAT BLDA-MCNC: 0.9 MG/DL (ref 0.6–1.3)

## 2023-02-17 PROCEDURE — 82565 ASSAY OF CREATININE: CPT

## 2023-02-17 PROCEDURE — 74177 CT ABD & PELVIS W/CONTRAST: CPT

## 2023-02-17 PROCEDURE — 71260 CT THORAX DX C+: CPT

## 2023-02-17 PROCEDURE — 25010000002 IOPAMIDOL 61 % SOLUTION: Performed by: INTERNAL MEDICINE

## 2023-02-17 RX ADMIN — IOPAMIDOL 85 ML: 612 INJECTION, SOLUTION INTRAVENOUS at 13:08

## 2023-02-17 NOTE — TELEPHONE ENCOUNTER
Oncology Social Work    OSW called patient's son Dayo Fletcher [phone] regarding transportation options for his mom.   OSW gave Mr. Fletcher [facility] [employer] contact information as well as [employer] transportation for assistance. He was thankful for the information. OSW will assist when needed.     Adrianna WALTERS

## 2023-02-24 ENCOUNTER — TELEPHONE (OUTPATIENT)
Dept: OTHER | Facility: HOSPITAL | Age: 83
End: 2023-02-24
Payer: MEDICARE

## 2023-02-24 NOTE — TELEPHONE ENCOUNTER
Oncology Social Work    OSW received a call from Dayo Fletcher ( patient's son) regarding transportation to her Monday 2/27 appointment. He was able to arrange transportation going home after the appointment with [employer]. He could not get her a ride to the appointment.  Verbal Consent received from patient to use [employer]. OSW scheduled a ride for the patient via [employer].     Adrianna WALTERS

## 2023-02-27 ENCOUNTER — LAB (OUTPATIENT)
Dept: LAB | Facility: HOSPITAL | Age: 83
End: 2023-02-27
Payer: MEDICARE

## 2023-02-27 ENCOUNTER — OFFICE VISIT (OUTPATIENT)
Dept: ONCOLOGY | Facility: CLINIC | Age: 83
End: 2023-02-27
Payer: MEDICARE

## 2023-02-27 VITALS
DIASTOLIC BLOOD PRESSURE: 68 MMHG | SYSTOLIC BLOOD PRESSURE: 132 MMHG | BODY MASS INDEX: 25.27 KG/M2 | HEART RATE: 78 BPM | HEIGHT: 63 IN | TEMPERATURE: 96.9 F | WEIGHT: 142.6 LBS | RESPIRATION RATE: 16 BRPM | OXYGEN SATURATION: 93 %

## 2023-02-27 DIAGNOSIS — C18.2 MALIGNANT NEOPLASM OF ASCENDING COLON: ICD-10-CM

## 2023-02-27 DIAGNOSIS — C18.2 MALIGNANT NEOPLASM OF ASCENDING COLON: Primary | ICD-10-CM

## 2023-02-27 LAB
ALBUMIN SERPL-MCNC: 4.1 G/DL (ref 3.5–5.2)
ALBUMIN/GLOB SERPL: 1.2 G/DL (ref 1.1–2.4)
ALP SERPL-CCNC: 84 U/L (ref 38–116)
ALT SERPL W P-5'-P-CCNC: 13 U/L (ref 0–33)
ANION GAP SERPL CALCULATED.3IONS-SCNC: 12.7 MMOL/L (ref 5–15)
AST SERPL-CCNC: 15 U/L (ref 0–32)
BASOPHILS # BLD AUTO: 0.04 10*3/MM3 (ref 0–0.2)
BASOPHILS NFR BLD AUTO: 0.6 % (ref 0–1.5)
BILIRUB SERPL-MCNC: 0.2 MG/DL (ref 0.2–1.2)
BUN SERPL-MCNC: 17 MG/DL (ref 6–20)
BUN/CREAT SERPL: 23.9 (ref 7.3–30)
CALCIUM SPEC-SCNC: 9.5 MG/DL (ref 8.5–10.2)
CHLORIDE SERPL-SCNC: 102 MMOL/L (ref 98–107)
CO2 SERPL-SCNC: 26.3 MMOL/L (ref 22–29)
CREAT SERPL-MCNC: 0.71 MG/DL (ref 0.6–1.1)
DEPRECATED RDW RBC AUTO: 50.6 FL (ref 37–54)
EGFRCR SERPLBLD CKD-EPI 2021: 85 ML/MIN/1.73
EOSINOPHIL # BLD AUTO: 0.3 10*3/MM3 (ref 0–0.4)
EOSINOPHIL NFR BLD AUTO: 4.2 % (ref 0.3–6.2)
ERYTHROCYTE [DISTWIDTH] IN BLOOD BY AUTOMATED COUNT: 15.2 % (ref 12.3–15.4)
GLOBULIN UR ELPH-MCNC: 3.4 GM/DL (ref 1.8–3.5)
GLUCOSE SERPL-MCNC: 142 MG/DL (ref 74–124)
HCT VFR BLD AUTO: 41 % (ref 34–46.6)
HGB BLD-MCNC: 12.8 G/DL (ref 12–15.9)
IMM GRANULOCYTES # BLD AUTO: 0.04 10*3/MM3 (ref 0–0.05)
IMM GRANULOCYTES NFR BLD AUTO: 0.6 % (ref 0–0.5)
LYMPHOCYTES # BLD AUTO: 2.03 10*3/MM3 (ref 0.7–3.1)
LYMPHOCYTES NFR BLD AUTO: 28.3 % (ref 19.6–45.3)
MCH RBC QN AUTO: 28.5 PG (ref 26.6–33)
MCHC RBC AUTO-ENTMCNC: 31.2 G/DL (ref 31.5–35.7)
MCV RBC AUTO: 91.3 FL (ref 79–97)
MONOCYTES # BLD AUTO: 0.44 10*3/MM3 (ref 0.1–0.9)
MONOCYTES NFR BLD AUTO: 6.1 % (ref 5–12)
NEUTROPHILS NFR BLD AUTO: 4.33 10*3/MM3 (ref 1.7–7)
NEUTROPHILS NFR BLD AUTO: 60.2 % (ref 42.7–76)
NRBC BLD AUTO-RTO: 0 /100 WBC (ref 0–0.2)
PLATELET # BLD AUTO: 212 10*3/MM3 (ref 140–450)
PMV BLD AUTO: 9.6 FL (ref 6–12)
POTASSIUM SERPL-SCNC: 3.7 MMOL/L (ref 3.5–4.7)
PROT SERPL-MCNC: 7.5 G/DL (ref 6.3–8)
RBC # BLD AUTO: 4.49 10*6/MM3 (ref 3.77–5.28)
SODIUM SERPL-SCNC: 141 MMOL/L (ref 134–145)
WBC NRBC COR # BLD: 7.18 10*3/MM3 (ref 3.4–10.8)

## 2023-02-27 PROCEDURE — 99214 OFFICE O/P EST MOD 30 MIN: CPT | Performed by: INTERNAL MEDICINE

## 2023-02-27 PROCEDURE — 80053 COMPREHEN METABOLIC PANEL: CPT

## 2023-02-27 PROCEDURE — 36415 COLL VENOUS BLD VENIPUNCTURE: CPT

## 2023-02-27 PROCEDURE — 85025 COMPLETE CBC W/AUTO DIFF WBC: CPT

## 2023-02-27 NOTE — PROGRESS NOTES
"Chief Complaint  Stage stage IIIB ([id number]) right colon cancer    Subjective        History of Present Illness    Patient returns today in follow-up after being seen in consultation on 2/9/2023, referred back to reestablish care in our office.  She has laboratory studies and CT scans to review today.  She had been experiencing some chronic diarrhea and had been referred to GI regarding her diarrhea and to pursue follow-up colonoscopy however she has not yet scheduled an appointment.  She has no new complaints today apart from her chronic diarrhea.  She does not take anything for the diarrhea at this point.  She denies any significant respiratory symptoms, reports only a very occasional cough.        Objective   Vital Signs:   /68   Pulse 78   Temp 96.9 °F (36.1 °C) (Temporal)   Resp 16   Ht 159 cm (62.6\")   Wt 64.7 kg (142 lb 9.6 oz)   SpO2 93%   BMI 25.59 kg/m²     Physical Exam  Constitutional:       Comments: Elderly female no distress   Eyes:      Conjunctiva/sclera: Conjunctivae normal.   Neck:      Thyroid: No thyromegaly.   Cardiovascular:      Rate and Rhythm: Normal rate and regular rhythm.      Heart sounds: No murmur heard.    No friction rub. No gallop.   Pulmonary:      Effort: No respiratory distress.      Comments: A few scattered crackles in the left base  Abdominal:      General: Bowel sounds are normal. There is no distension.      Palpations: Abdomen is soft.      Tenderness: There is no abdominal tenderness.   Lymphadenopathy:      Head:      Right side of head: No submandibular adenopathy.      Cervical: No cervical adenopathy.      Upper Body:      Right upper body: No supraclavicular adenopathy.      Left upper body: No supraclavicular adenopathy.   Skin:     General: Skin is warm and dry.      Findings: No rash.   Neurological:      Mental Status: She is alert and oriented to person, place, and time.      Cranial Nerves: No cranial nerve deficit.      Motor: No abnormal muscle " "tone.      Deep Tendon Reflexes: Reflexes normal.   Psychiatric:         Behavior: Behavior normal.       Patient was examined today, unchanged from above     Result Review : Reviewed CBC, CMP from today.  Reviewed CEA from 2/9/2023.  Reviewed CT chest abdomen pelvis 2/17/2023.       Assessment and Plan     *Stage stage IIIB ([id number]) right colon cancer  · The patient was previously seen in our office by Dr. Amezquita for colon cancer.    · CT abdomen and pelvis on 12/27/2016 showed long segment dilation and wall thickening of the mid and distal small bowel with enhancement and wall thickening in the terminal ileum and ileocecal valve.    · Preoperative CEA was elevated on 12/30/2016 at 37.36.   · On 12/31/2016 patient underwent right hemicolectomy.  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  Margins clear.  13 negative lymph nodes.  Solitary focus of tumor in pericecal soft tissue (tumor deposit).  Cholecystectomy performed at the same time, pathology showed chronic cholecystitis and no malignancy.    · Dr. Amezquita recommended adjuvant Xeloda chemotherapy in early 2017.    · Patient underwent chemo education in February 2017.   · There were significant issues with compliance with office visits.    · It was unclear when she was seen on 4/27/2017 whether she had been taking Xeloda.    · She underwent CT chest abdomen pelvis on 4/27/2017 which showed patchy reticulonodular opacities in the periphery of the right lung that were new, consistent with atypical infection.  There was no evidence of metastatic disease.    · Last visit was on 5/9/2017 and patient reported that she had been taking Xeloda however this was unclear.  She did not follow-up thereafter.  · CT chest on 3/16/2022 was performed during hospital admission for rhinovirus infection at [facility].  Scan showed multifocal pulmonary infiltrates, new " noncalcified pulmonary nodules up to 1 cm possibly related to infection, wall thickening consistent with bronchitis in the lower lobes, 2 cm prevascular soft tissue nodule, possibly thymoma with a few additional mildly enlarged mediastinal lymph nodes and evidence of upper abdominal ascites.    · Patient was referred back to our office to reestablish care and was seen in consultation on 2/9/2023.   · New baseline CEA 2/9/2023 was normal at 1.72  · New baseline CT chest abdomen pelvis 2/17/2023 showed no evidence of recurrent/metastatic disease.  In comparison to 3/9/2022, there was a shifting pattern of tree-in-bud opacities, overall improved and felt to likely be infectious or inflammatory rather than related to malignancy.  · The patient is seen back in follow-up today to review CEA and CT scan results.  Fortunately, CEA was normal and CT scan shows no evidence to suggest recurrent malignancy.  At this point, given the amount of time out from patient's surgery on 12/31/2016, there is no need to pursue further routine radiographic monitoring nor routine CEA monitoring.  Again, it is unclear whether she indeed actually took a significant amount of her adjuvant Xeloda chemotherapy.  Nevertheless, she has no evidence of recurrence at this time.  She has had chronic diarrhea which predated the time of her colonic surgery and persists unchanged 4-5 stools per day.  She has not undergone follow-up colonoscopy and at the last visit we did refer to GI to pursue this however a visit has not yet been scheduled and we will inquire.  Patient was advised to use Imodium for diarrhea and can further discuss management of this as well with GI at her visit.  We will see her back here in approximately 3 months.  Pending the colonoscopy results we will likely switch to annual clinical follow-up at that point.    *Chronic diarrhea  · The patient has had considerable difficulty with chronic diarrhea all of her life.  This predated her  colon cancer diagnosis and surgery.    · She notes more than 4-5 stools per day.  She has had occasional blood in her stool felt to be related to hemorrhoids.  She has become accustomed to the diarrhea and does not take any medication for the diarrhea.  She is unsure whether this has been evaluated previously.  · Patient has been referred to GI for evaluation of diarrhea and pursuit of repeat colonoscopy.  Visit has not yet been scheduled and we will inquire.  Patient was advised to use Imodium as needed for now.    *CHF  · Echocardiogram 3/10/2022 showed evidence of CHF with ejection fraction 21%.    · Cardiac catheterization 3/16/2022 showed mild coronary artery disease.    · Subsequent hospitalization 3/25 - 3/27/2022 due to CHF exacerbation.    *Persistent pulmonary infiltrates  · CT chest on 3/16/2022 was performed during hospital admission for rhinovirus infection at [facility].  Scan showed multifocal pulmonary infiltrates, new noncalcified pulmonary nodules up to 1 cm possibly related to infection, wall thickening consistent with bronchitis in the lower lobes.  · New baseline CT chest abdomen pelvis 2/17/2023 showed no evidence of recurrent/metastatic disease.  In comparison to 3/9/2022, there was a shifting pattern of tree-in-bud opacities, overall improved and felt to likely be infectious or inflammatory rather than related to malignancy.  · Patient reports only a very occasional minimal cough.  In regards to the pulmonary infiltrates, we will refer to pulmonary to see if any additional evaluation is recommended as this does not appear to be likely secondary to more of an infectious/inflammatory etiology.    *Social  · The patient has difficulty with transportation limiting access to care in the past.  Oncology social work has been involved in providing some assistance for transportation to office visits.    Plan:  1. Referral to GI regarding chronic diarrhea as well as history of colon cancer (no endoscopic  evaluation since surgery)  2. Referral to pulmonary regarding persistent/chronic infectious/inflammatory change noted in the lungs bilaterally  3. Patient advised to use Imodium as needed for diarrhea  4. Return visit in 3 months with CBC, CMP

## 2023-03-01 ENCOUNTER — TELEPHONE (OUTPATIENT)
Dept: ONCOLOGY | Facility: CLINIC | Age: 83
End: 2023-03-01
Payer: MEDICARE

## 2023-03-01 NOTE — TELEPHONE ENCOUNTER
Caller: ANJU FARR    Relationship: Emergency Contact    Best call back number: [phone]    What is the best time to reach you: ANYTIME    Who are you requesting to speak with (clinical staff, provider, specific staff member): CLINICAL TEAM    What was the call regarding: ANJU STATED THAT THE PULMONOLOGY OFFICE NEEDS MED RECS SENT OVER BEFORE THEY CAN SCHEDULE PT FOR AN APPT. PLEASE FAX RECS -[phone]. PLEASE CALL PTS SON WITH ANY QUESTIONS.     Do you require a callback: YES

## 2024-05-27 NOTE — CONSULTS
Orthopedic Consult      Patient: Lisbet Fletcher  YOB: 1940     Date of Admission: 10/4/2020  8:40 PM    Medical Record Number: [id number]    Attending Physician: Stefan Yi MD    Consulting Physician: Michael Amaya MD    Reason for Consult: LEFT proximal humerus fracture with displacement    History of Present Illness: 80 y.o. female admitted to [facility], [place], operated by [facility] with Hypoxia [R09.02]  Strain of neck muscle, initial encounter [S16.1XXA]  Injury of head, initial encounter [S09.90XA]  Fall, initial encounter [W19.XXXA]  Other closed displaced fracture of proximal end of left humerus, initial encounter [S42.292A].     The patient was evaluated in the emergency room and was diagnosed with a  LEFT proximal humerus fracture.  There was a suspicion for dislocation.  Significant displacement was noted on the x-ray.     Secondary to the age and multiple medical co morbidities the patient was admitted to the hospitalist.   As I was on call for the emergency room I was consulted for further evaluation and treatment.   The patient was in the usual state of health and   Fell while running outside the house after her grandchild . She noticed immediate onset of sudden severe pain in the LEFT shoulder and inability to use the LEFT arm.     She lives with her grand children. She denies any problems with her left shoulder    The patient denies history of dementia.    Patient denies any history of: DVT/PE, MRSA, COPD, CHF, CAD,  Dementia or A-Fib.   The patient has history of : Diabetes mellitus,  The patient is not  on anticoagulants:       Past medical history, Past surgical history, family history, Social history, current medications, home medications Have been reviewed by me.    Past Medical History:   Diagnosis Date   • Anemia    • Diabetes mellitus (CMS/HCC)    • Disease of thyroid gland    • H/O Lung mass    • Hyperlipidemia    • Hypertension    • Malignant neoplasm of ascending colon (CMS/HCC)     • Neuropathy      Past Surgical History:   Procedure Laterality Date   • CHOLECYSTECTOMY N/A 12/31/2016    Procedure: CHOLECYSTECTOMY;  Surgeon: Dmitri Aranda MD;  Location: [facility] MAIN OR;  Service:    • COLON RESECTION Right 12/31/2016    Procedure: COLON RESECTION RIGHT;  Surgeon: Dmitri Aranda MD;  Location: [facility] MAIN OR;  Service:    • HYSTERECTOMY       Social History     Occupational History     Employer: RETIRED   Tobacco Use   • Smoking status: Never Smoker   Substance and Sexual Activity   • Alcohol use: No   • Drug use: No   • Sexual activity: Defer      Social History     Social History Narrative   • Not on file     Family History   Problem Relation Age of Onset   • Heart disease Mother    • Cancer Father    • Cancer Sister    • Cancer Brother    • Throat cancer Son         with metastases   • Diabetes Other    • Cancer Brother    • Cancer Sister    • Cancer Sister    • Cancer Sister         No Known Allergies     Home Medications:  Medications Prior to Admission   Medication Sig Dispense Refill Last Dose   • aspirin 325 MG tablet Take 325 mg by mouth Daily.   10/4/2020 at Unknown time   • carvedilol (COREG) 6.25 MG tablet Take 1 tablet by mouth 2 (two) times a day with meals. 60 tablet 0 10/4/2020 at Unknown time   • Cholecalciferol (vitamin D3) 125 MCG (5000 UT) capsule capsule Take 5,000 Units by mouth Daily.   10/4/2020 at Unknown time   • gabapentin (NEURONTIN) 800 MG tablet Take 800 mg by mouth 3 (Three) Times a Day.   10/4/2020 at Unknown time   • levothyroxine (SYNTHROID, LEVOTHROID) 50 MCG tablet Take 88 mcg by mouth Daily.   10/4/2020 at Unknown time   • metFORMIN (GLUCOPHAGE) 500 MG tablet Take 500 mg by mouth 2 (two) times a day with meals.   10/4/2020 at Unknown time   • ondansetron (ZOFRAN) 4 MG tablet Take 1 tablet by mouth Every 8 (Eight) Hours As Needed for nausea or vomiting. 30 tablet 1 Past Month at Unknown time   • pravastatin (PRAVACHOL) 40 MG tablet Take 40 mg by mouth  "daily.   10/4/2020 at Unknown time   • vitamin B-12 (CYANOCOBALAMIN) 100 MCG tablet Take 50 mcg by mouth Daily.   10/4/2020 at Unknown time   • capecitabine (XELODA) 500 MG chemo tablet Take 2 tabs (1000 mg) po twice a day for 14 days on then 7 days off. 56 tablet 0    • colchicine 0.6 MG tablet Take 1 tablet by mouth every 12 (twelve) hours. Until pain completely gone in ankle 60 tablet 0    • dicyclomine (BENTYL) 20 MG tablet Take 1 tablet by mouth Every 6 (Six) Hours. (Patient taking differently: Take 20 mg by mouth 4 (Four) Times a Day As Needed.) 30 tablet 0 Unknown at Unknown time   • ergocalciferol (ERGOCALCIFEROL) 51184 UNITS capsule Take 50,000 Units by mouth.      • hydrochlorothiazide (HYDRODIURIL) 25 MG tablet Take 25 mg by mouth daily.      • PREVNAR 13 vaccine inject 0.5 milliliter intramuscularly  0 Unknown at Unknown time       Current Medications:  Scheduled Meds:[START ON 10/6/2020] influenza vaccine, 0.5 mL, Intramuscular, Once      Continuous Infusions:   PRN Meds:.•  acetaminophen  •  aluminum-magnesium hydroxide-simethicone  •  bisacodyl  •  bisacodyl  •  Morphine  •  ondansetron **OR** ondansetron  •  [COMPLETED] Insert peripheral IV **AND** sodium chloride  •  sodium chloride    Review of Systems:   A 12 point system review was reviewed with the patient and from the chart  and is negative except as mentioned in history of present illness.      Physical Exam: 80 y.o. female                    Vitals:    10/04/20 2300 10/05/20 0000 10/05/20 0100 10/05/20 0157   BP: 145/64 108/62 152/67 150/68   BP Location:    Left arm   Patient Position:    Lying   Pulse:    78   Resp:    20   Temp:    98.8 °F (37.1 °C)   TempSrc:    Oral   SpO2: 96% 97% 97% 93%   Weight:    69.7 kg (153 lb 10.6 oz)   Height:    157.5 cm (62\")        Gait: Not evaluated.     Mental/HEENT/cardio/skin: The patient's general appearance was well-nourished, well-hydrated, no acute distress.  Orientation was alert and oriented ×3.  " The patient's mood was normal.   Pulmonary exam shows normal late exchange, no labored breathing, or shortness of breath.    The  skin exam showed normal temperature and color in the area of examination.    Extremities: LEFT shoulder positive swelling, positive tenderness, attempted movements of the shoulder, painful and restricted.  She is able to do gentle active range of motion of her fingers.  Gross sensation is intact over the fingers.  Good capillary refill    Pulses: Pulses palpable and equal bilaterally    Diagnostic Tests:    Results from last 7 days   Lab Units 10/04/20  2052   WBC 10*3/mm3 6.18   HEMOGLOBIN g/dL 12.2   HEMATOCRIT % 36.8   PLATELETS 10*3/mm3 179     Results from last 7 days   Lab Units 10/04/20  2052   SODIUM mmol/L 142   POTASSIUM mmol/L 3.5   CHLORIDE mmol/L 101   CO2 mmol/L 30.8*   BUN mg/dL 14   CREATININE mg/dL 0.86   GLUCOSE mg/dL 191*   CALCIUM mg/dL 8.7         Lab Results   Component Value Date    URICACID 4.1 09/10/2016     No results found for: CRYSTAL  Microbiology Results (last 10 days)     Procedure Component Value - Date/Time    COVID PRE-OP / PRE-PROCEDURE SCREENING ORDER (NO ISOLATION) - Swab, Nasopharynx [[id number]]  (Normal) Collected: 10/04/20 2350    Lab Status: Final result Specimen: Swab from Nasopharynx Updated: 10/05/20 0105    Narrative:      The following orders were created for panel order COVID PRE-OP / PRE-PROCEDURE SCREENING ORDER (NO ISOLATION) - Swab, Nasopharynx.  Procedure                               Abnormality         Status                     ---------                               -----------         ------                     Respiratory Panel PCR w/...[[id number]]  Normal              Final result                 Please view results for these tests on the individual orders.    Respiratory Panel PCR w/COVID-19(SARS-CoV-2) JOE/ABDOULAYE/FOX/PAD/[facility] In-House, NP Swab in UTM/VTM, 3-4 HR TAT - Swab, Nasopharynx [[id number]]  (Normal) Collected: 10/04/20  2350    Lab Status: Final result Specimen: Swab from Nasopharynx Updated: 10/05/20 0105     ADENOVIRUS, PCR Not Detected     Coronavirus 229E Not Detected     Coronavirus HKU1 Not Detected     Coronavirus NL63 Not Detected     Coronavirus OC43 Not Detected     COVID19 Not Detected     Human Metapneumovirus Not Detected     Human Rhinovirus/Enterovirus Not Detected     Influenza A PCR Not Detected     Influenza A H1 Not Detected     Influenza A H1 2009 PCR Not Detected     Influenza A H3 Not Detected     Influenza B PCR Not Detected     Parainfluenza Virus 1 Not Detected     Parainfluenza Virus 2 Not Detected     Parainfluenza Virus 3 Not Detected     Parainfluenza Virus 4 Not Detected     RSV, PCR Not Detected     Bordetella pertussis pcr Not Detected     Bordetella parapertussis PCR Not Detected     Chlamydophila pneumoniae PCR Not Detected     Mycoplasma pneumo by PCR Not Detected    Narrative:      Fact sheet for providers: https://[employer]/wp-content/uploads/XQR6521-9186-CC4.1-EUA-Provider-Fact-Sheet-3.pdf    Fact sheet for patients: https://[employer]/wp-content/uploads/UXJ5813-2870-DV5.1-EUA-Patient-Fact-Sheet-1.pdf  Fact sheet for providers: https://[employer]/wp-content/uploads/FGN5266-6947-OB3.1-EUA-Provider-Fact-Sheet-3.pdf    Fact sheet for patients: https://[employer]/wp-content/uploads/ZNK7855-5075-DL9.1-EUA-Patient-Fact-Sheet-1.pdf        Xr Shoulder 2+ View Left    Result Date: 10/4/2020  Impacted comminuted proximal left humeral fracture.  This report was finalized on 10/4/2020 9:21 PM by Dr. Kristin Isaacs M.D.      Ct Head Without Contrast    Result Date: 10/4/2020  No acute intracranial hemorrhage.  Radiation dose reduction techniques were utilized, including automated exposure control and exposure modulation based on body size.  This report was finalized on 10/4/2020 10:25 PM by Dr. Kristin Isaacs M.D.      Ct Cervical Spine Without Contrast    Result Date:  10/4/2020  No acute fracture or subluxation identified.  Radiation dose reduction techniques were utilized, including automated exposure control and exposure modulation based on body size.  This report was finalized on 10/4/2020 10:31 PM by Dr. Kristin Isaacs M.D.      Xr Chest 1 View    Result Date: 10/5/2020  No acute intrathoracic findings.  This report was finalized on 10/5/2020 12:07 AM by Dr. Kristin Isaacs M.D.        The labs, X-ray results for preoperative evaluation have been reviewed by me.    Assessment: LEFT proximal humerus fracture with displacement    Patient Active Problem List   Diagnosis   • Community acquired pneumonia   • Neuropathy   • Hyperlipidemia   • DM (diabetes mellitus) (CMS/HCC)   • Hypothyroidism   • Anemia   • Chest pain, right sided pleuritic   • Acute respiratory failure with hypoxemia (CMS/HCC)   • Malignant neoplasm of ascending colon (CMS/HCC)   • Colon adenocarcinoma (CMS/HCC)   • Fall       Plan:    Options and alternatives were discussed in detail with the patient.   X-rays suggestive of impacted proximal humerus fracture with some displacement of the humeral shaft.  Overall angulation appears to be satisfactory.  The patient is indicated for further evaluation with a CT scan of the LEFT shoulder.   This is being ordered.   further recommendations will be based on the CT scan.  She can have regular diet today.    Date: 10/5/2020    Michael Amaya MD     CC: System, Provider Not In; MD Kassidy Gaspar Stephen J, MD             Him/He